# Patient Record
Sex: MALE | Race: WHITE | NOT HISPANIC OR LATINO | Employment: FULL TIME | URBAN - METROPOLITAN AREA
[De-identification: names, ages, dates, MRNs, and addresses within clinical notes are randomized per-mention and may not be internally consistent; named-entity substitution may affect disease eponyms.]

---

## 2017-01-17 NOTE — PROGRESS NOTES
SUBJECTIVE:     CC: Alejandro Johnson is an 46 year old male who presents for preventative health visit.   Cristian Farrell MD  [facility] for HPI/ROS submitted by the patient on 1/24/2017   Annual Exam:  Getting at least 3 servings of Calcium per day:: NO  Bi-annual eye exam:: Yes  Dental care twice a year:: Yes  Sleep apnea or symptoms of sleep apnea:: None  Diet:: Regular (no restrictions)  Frequency of exercise:: None  Taking medications regularly:: Yes  Medication side effects:: Other  Additional concerns today:: No  PHQ-2 Depressed: Several days, Several days  PHQ-2 Score: 2    1. No change at all.    Asthma under good    Today's PHQ-2 Score:   PHQ-2 ( 1999 Pfizer) 2/16/2012   Q1: Little interest or pleasure in doing things 1   Q2: Feeling down, depressed or hopeless 1   PHQ-2 Score 2       Abuse: Current or Past(Physical, Sexual or Emotional)- No  Do you feel safe in your environment - Yes    Social History   Substance Use Topics     Smoking status: Never Smoker      Smokeless tobacco: Never Used     Alcohol Use: No     The patient does not drink >3 drinks per day nor >7 drinks per week.     * Patient needs refills on medications.  Albania Villarreal MA      Last PSA: No results found for: PSA    Recent Labs   Lab Test  12/31/13   1435  02/19/12   1541   CHOL  193  198   HDL  36*  36*   LDL  130*  126   TRIG  136  181*   CHOLHDLRATIO  5.4*  6.0*       Reviewed orders with patient. Reviewed health maintenance and updated orders accordingly - Yes    All Histories reviewed and updated in Epic.      ROS:  C: NEGATIVE for fever, chills, change in weight  I: NEGATIVE for worrisome rashes, moles or lesions  E: NEGATIVE for vision changes or irritation  ENT: NEGATIVE for ear, mouth and throat problems  R: NEGATIVE for significant cough or SOB  CV: NEGATIVE for chest pain, palpitations or peripheral edema  GI: NEGATIVE for nausea, abdominal pain, heartburn, or change in bowel habits    male: negative for dysuria, hematuria, decreased urinary stream, erectile dysfunction, urethral discharge  M: NEGATIVE for significant arthralgias or myalgia  N: NEGATIVE for weakness, dizziness or paresthesias  P: NEGATIVE for changes in mood or affect    Problem list, Medication list, Allergies, and Medical/Social/Surgical histories reviewed in [facility] and updated as appropriate.  Labs reviewed in EPIC  BP Readings from Last 3 Encounters:   01/24/17 112/78   01/20/16 134/80   01/08/15 124/78    Wt Readings from Last 3 Encounters:   01/24/17 212 lb (96.163 kg)   01/20/16 214 lb (97.07 kg)   01/08/15 216 lb (97.977 kg)                  Patient Active Problem List   Diagnosis     CARDIOVASCULAR SCREENING; LDL GOAL LESS THAN 160     Moderate recurrent major depression (H)     ADD (attention deficit disorder)     Family history of diabetes mellitus     Overweight (BMI 25.0-29.9)     Seborrheic dermatitis     Asthma, mild intermittent, well-controlled     Intermittent asthma     Chronic tonsillitis     Advanced directives, counseling/discussion     Eosinophilic esophagitis     Past Surgical History   Procedure Laterality Date     Ent surgery       septoplasty     [place] teeth extraction       Tonsillectomy adult  8/7/2012     Procedure: TONSILLECTOMY ADULT;  Tonsillectomy;  Surgeon: Bipin San MD;  Location:  OR     Endoscopy  6-11-14       Social History   Substance Use Topics     Smoking status: Never Smoker      Smokeless tobacco: Never Used     Alcohol Use: No     Family History   Problem Relation Age of Onset     DIABETES Mother 72     DIABETES Father          Current Outpatient Prescriptions   Medication Sig Dispense Refill     venlafaxine (EFFEXOR-XR) 75 MG 24 hr capsule Take 1 capsule (75 mg) by mouth daily 90 capsule 3     triamcinolone (KENALOG) 0.1 % cream Apply sparingly to affected area three times daily as needed 80 g 11     sildenafil (REVATIO/VIAGRA) 100 MG cap/tab Take 1 tablet (100 mg) by  mouth daily as needed for erectile dysfunction 18 tablet 11     loperamide (IMODIUM A-D) 2 MG tablet Start with 2 tabs (4 mg), then take one tab (2 mg) after each diarrheal stool.  Do not use more than  8 tabs (16 mg) per day. 30 tablet 1     No Known Allergies  OBJECTIVE:     There were no vitals taken for this visit.  EXAM:  GENERAL: healthy, alert and no distress  EYES: Eyes grossly normal to inspection, PERRL and conjunctivae and sclerae normal  HENT: ear canals and TM's normal, nose and mouth without ulcers or lesions  NECK: no adenopathy, no asymmetry, masses, or scars and thyroid normal to palpation  RESP: lungs clear to auscultation - no rales, rhonchi or wheezes  CV: regular rate and rhythm, normal S1 S2, no S3 or S4, no murmur, click or rub, no peripheral edema and peripheral pulses strong  ABDOMEN: soft, nontender, no hepatosplenomegaly, no masses and bowel sounds normal  MS: no gross musculoskeletal defects noted, no edema  SKIN: no suspicious lesions or rashes  NEURO: Normal strength and tone, mentation intact and speech normal  BACK: no CVA tenderness, no paralumbar tenderness  PSYCH: mentation appears normal, affect normal/bright    ASSESSMENT/PLAN:         ICD-10-CM    1. Decreased libido R68.82 Prolactin     Basic metabolic panel     Lipid panel reflex to direct LDL     Testosterone, total     sildenafil (REVATIO/VIAGRA) 100 MG cap/tab   2. Moderate recurrent major depression (H) F33.1 venlafaxine (EFFEXOR-XR) 75 MG 24 hr capsule   3. Lichen planus L43.9 triamcinolone (KENALOG) 0.1 % cream   4. Need for prophylactic vaccination and inoculation against influenza Z23 FLU VAC, SPLIT VIRUS IM > 3 YO (QUADRIVALENT) [23355]     Vaccine Administration, Initial [94415]       COUNSELING:  Reviewed preventive health counseling, as reflected in patient instructions       Consider AAA screening for ages 65-75 and smoking history       Regular exercise       Healthy diet/nutrition       Vision screening        "Hearing screening         reports that he has never smoked. He has never used smokeless tobacco.    Estimated body mass index is 28.52 kg/(m^2) as calculated from the following:    Height as of 1/20/16: 6' 0.64\" (1.845 m).    Weight as of 1/20/16: 214 lb (97.07 kg).       ICD-10-CM    1. Decreased libido R68.82 Prolactin     Basic metabolic panel     Lipid panel reflex to direct LDL     Testosterone, total     sildenafil (REVATIO/VIAGRA) 100 MG cap/tab   2. Moderate recurrent major depression (H) F33.1 venlafaxine (EFFEXOR-XR) 75 MG 24 hr capsule   3. Lichen planus L43.9 triamcinolone (KENALOG) 0.1 % cream   4. Need for prophylactic vaccination and inoculation against influenza Z23 FLU VAC, SPLIT VIRUS IM > 3 YO (QUADRIVALENT) [34291]     Vaccine Administration, Initial [40818]     Counseling Resources:  ATP IV Guidelines  Pooled Cohorts Equation Calculator  FRAX Risk Assessment  ICSI Preventive Guidelines  Dietary Guidelines for Americans, 2010  USDA's MyPlate  ASA Prophylaxis  Lung CA Screening      "

## 2017-01-24 ENCOUNTER — OFFICE VISIT (OUTPATIENT)
Dept: PEDIATRICS | Facility: CLINIC | Age: 47
End: 2017-01-24
Payer: COMMERCIAL

## 2017-01-24 VITALS
OXYGEN SATURATION: 96 % | WEIGHT: 212 LBS | TEMPERATURE: 97.8 F | DIASTOLIC BLOOD PRESSURE: 78 MMHG | BODY MASS INDEX: 28.1 KG/M2 | SYSTOLIC BLOOD PRESSURE: 112 MMHG | HEART RATE: 82 BPM | HEIGHT: 73 IN

## 2017-01-24 DIAGNOSIS — L43.9 LICHEN PLANUS: ICD-10-CM

## 2017-01-24 DIAGNOSIS — Z00.00 ENCOUNTER FOR ROUTINE ADULT HEALTH EXAMINATION WITHOUT ABNORMAL FINDINGS: ICD-10-CM

## 2017-01-24 DIAGNOSIS — R68.82 DECREASED LIBIDO: Primary | ICD-10-CM

## 2017-01-24 DIAGNOSIS — F33.1 MODERATE RECURRENT MAJOR DEPRESSION (H): ICD-10-CM

## 2017-01-24 DIAGNOSIS — Z23 NEED FOR PROPHYLACTIC VACCINATION AND INOCULATION AGAINST INFLUENZA: ICD-10-CM

## 2017-01-24 LAB
ANION GAP SERPL CALCULATED.3IONS-SCNC: 9 MMOL/L (ref 3–14)
BUN SERPL-MCNC: 11 MG/DL (ref 7–30)
CALCIUM SERPL-MCNC: 9.2 MG/DL (ref 8.5–10.1)
CHLORIDE SERPL-SCNC: 104 MMOL/L (ref 94–109)
CHOLEST SERPL-MCNC: 186 MG/DL
CO2 SERPL-SCNC: 25 MMOL/L (ref 20–32)
CREAT SERPL-MCNC: 0.77 MG/DL (ref 0.66–1.25)
GFR SERPL CREATININE-BSD FRML MDRD: ABNORMAL ML/MIN/1.7M2
GLUCOSE SERPL-MCNC: 105 MG/DL (ref 70–99)
HDLC SERPL-MCNC: 37 MG/DL
LDLC SERPL CALC-MCNC: 112 MG/DL
NONHDLC SERPL-MCNC: 149 MG/DL
POTASSIUM SERPL-SCNC: 4 MMOL/L (ref 3.4–5.3)
PROLACTIN SERPL-MCNC: 9 UG/L (ref 2–18)
SODIUM SERPL-SCNC: 138 MMOL/L (ref 133–144)
TRIGL SERPL-MCNC: 183 MG/DL

## 2017-01-24 PROCEDURE — 90686 IIV4 VACC NO PRSV 0.5 ML IM: CPT | Performed by: INTERNAL MEDICINE

## 2017-01-24 PROCEDURE — 99396 PREV VISIT EST AGE 40-64: CPT | Mod: 25 | Performed by: INTERNAL MEDICINE

## 2017-01-24 PROCEDURE — 90471 IMMUNIZATION ADMIN: CPT | Performed by: INTERNAL MEDICINE

## 2017-01-24 PROCEDURE — 80048 BASIC METABOLIC PNL TOTAL CA: CPT | Performed by: INTERNAL MEDICINE

## 2017-01-24 PROCEDURE — 99213 OFFICE O/P EST LOW 20 MIN: CPT | Mod: 25 | Performed by: INTERNAL MEDICINE

## 2017-01-24 PROCEDURE — 80061 LIPID PANEL: CPT | Performed by: INTERNAL MEDICINE

## 2017-01-24 PROCEDURE — 84403 ASSAY OF TOTAL TESTOSTERONE: CPT | Performed by: INTERNAL MEDICINE

## 2017-01-24 PROCEDURE — 84146 ASSAY OF PROLACTIN: CPT | Performed by: INTERNAL MEDICINE

## 2017-01-24 PROCEDURE — 36415 COLL VENOUS BLD VENIPUNCTURE: CPT | Performed by: INTERNAL MEDICINE

## 2017-01-24 RX ORDER — VENLAFAXINE HYDROCHLORIDE 75 MG/1
75 CAPSULE, EXTENDED RELEASE ORAL DAILY
Qty: 90 CAPSULE | Refills: 3 | Status: SHIPPED | OUTPATIENT
Start: 2017-01-24 | End: 2018-01-02

## 2017-01-24 RX ORDER — TRIAMCINOLONE ACETONIDE 1 MG/G
CREAM TOPICAL
Qty: 80 G | Refills: 11 | Status: SHIPPED | OUTPATIENT
Start: 2017-01-24 | End: 2018-02-01

## 2017-01-24 RX ORDER — SILDENAFIL 100 MG/1
100 TABLET, FILM COATED ORAL DAILY PRN
Qty: 18 TABLET | Refills: 11 | Status: SHIPPED
Start: 2017-01-24 | End: 2018-02-01

## 2017-01-24 ASSESSMENT — ASTHMA QUESTIONNAIRES
QUESTION_2 LAST FOUR WEEKS HOW OFTEN HAVE YOU HAD SHORTNESS OF BREATH: NOT AT ALL
QUESTION_5 LAST FOUR WEEKS HOW WOULD YOU RATE YOUR ASTHMA CONTROL: COMPLETELY CONTROLLED
QUESTION_3 LAST FOUR WEEKS HOW OFTEN DID YOUR ASTHMA SYMPTOMS (WHEEZING, COUGHING, SHORTNESS OF BREATH, CHEST TIGHTNESS OR PAIN) WAKE YOU UP AT NIGHT OR EARLIER THAN USUAL IN THE MORNING: NOT AT ALL
QUESTION_4 LAST FOUR WEEKS HOW OFTEN HAVE YOU USED YOUR RESCUE INHALER OR NEBULIZER MEDICATION (SUCH AS ALBUTEROL): NOT AT ALL
ACT_TOTALSCORE: 25
QUESTION_1 LAST FOUR WEEKS HOW MUCH OF THE TIME DID YOUR ASTHMA KEEP YOU FROM GETTING AS MUCH DONE AT WORK, SCHOOL OR AT HOME: NONE OF THE TIME

## 2017-01-24 ASSESSMENT — PAIN SCALES - GENERAL: PAINLEVEL: NO PAIN (0)

## 2017-01-24 NOTE — MR AVS SNAPSHOT
After Visit Summary   1/24/2017    Alejandro Johnson    MRN: [id number]           Patient Information     Date Of Birth          1970        Visit Information        Provider Department      1/24/2017 10:00 AM [facility]        Today's Diagnoses     Decreased libido    -  1     Moderate recurrent major depression (H)         Lichen planus           Care Instructions      Preventive Health Recommendations  Male Ages 40 to 49    Yearly exam:             See your health care provider every year in order to  o   Review health changes.   o   Discuss preventive care.    o   Review your medicines if your doctor has prescribed any.    You should be tested each year for STDs (sexually transmitted diseases) if you re at risk.     Have a cholesterol test every 5 years.     Have a colonoscopy (test for colon cancer) if someone in your family has had colon cancer or polyps before age 50.     After age 45, have a diabetes test (fasting glucose). If you are at risk for diabetes, you should have this test every 3 years.      Talk with your health care provider about whether or not a prostate cancer screening test (PSA) is right for you.    Shots: Get a flu shot each year. Get a tetanus shot every 10 years.     Nutrition:    Eat at least 5 servings of fruits and vegetables daily.     Eat whole-grain bread, whole-wheat pasta and brown rice instead of white grains and rice.     Talk to your provider about Calcium and Vitamin D.     Lifestyle    Exercise for at least 150 minutes a week (30 minutes a day, 5 days a week). This will help you control your weight and prevent disease.     Limit alcohol to one drink per day.     No smoking.     Wear sunscreen to prevent skin cancer.     See your dentist every six months for an exam and cleaning.            Follow-ups after your visit        Who to contact     If you have questions or need follow up information about today's clinic  "visit or your schedule please contact [facility] directly at [phone].  Normal or non-critical lab and imaging results will be communicated to you by MyChart, letter or phone within 4 business days after the clinic has received the results. If you do not hear from us within 7 days, please contact the clinic through Virobay[employer] or phone. If you have a critical or abnormal lab result, we will notify you by phone as soon as possible.  Submit refill requests through [employer] or call your pharmacy and they will forward the refill request to us. Please allow 3 business days for your refill to be completed.          Additional Information About Your Visit        VirobayharVidmind Information     [employer]Social gives you secure access to your electronic health record. If you see a primary care provider, you can also send messages to your care team and make appointments. If you have questions, please call your primary care clinic.  If you do not have a primary care provider, please call [phone] and they will assist you.        Care EveryWhere ID     This is your Care EveryWhere ID. This could be used by other organizations to access your [place] medical records  [id number]        Your Vitals Were     Pulse Temperature Height BMI (Body Mass Index) Pulse Oximetry       82 97.8  F (36.6  C) (Oral) 6' 1\" (1.854 m) 27.98 kg/m2 96%        Blood Pressure from Last 3 Encounters:   01/24/17 112/78   01/20/16 134/80   01/08/15 124/78    Weight from Last 3 Encounters:   01/24/17 212 lb (96.163 kg)   01/20/16 214 lb (97.07 kg)   01/08/15 216 lb (97.977 kg)              We Performed the Following     Basic metabolic panel     Lipid panel reflex to direct LDL     Prolactin     Testosterone, total          Today's Medication Changes          These changes are accurate as of: 1/24/17 10:34 AM.  If you have any questions, ask your nurse or doctor.               Start taking these medicines.        Dose/Directions    sildenafil " 100 MG cap/tab   Commonly known as:  REVATIO/VIAGRA   Used for:  Decreased libido   Started by:  Cristian Farrell MD        Dose:  100 mg   Take 1 tablet (100 mg) by mouth daily as needed for erectile dysfunction   Quantity:  18 tablet   Refills:  11         Stop taking these medicines if you haven't already. Please contact your care team if you have questions.     itraconazole 100 MG capsule   Commonly known as:  SPORANOX   Stopped by:  Cristian Farrell MD                Where to get your medicines      These medications were sent to [facility] - [place] - [address]  [address] [place] [place] [address]     Phone:  [phone]    - triamcinolone 0.1 % cream  - venlafaxine 75 MG 24 hr capsule      Call your pharmacy to confirm that your medication is ready for pickup. It may take up to 24 hours for them to receive the prescription. If the prescription is not ready within 3 business days, please contact your clinic or your provider.     We will let you know when these medications are ready. If you don't hear back within 3 business days, please contact us.    - sildenafil 100 MG cap/tab             Primary Care Provider Office Phone # Fax #    Cristian Farrell [phone]       [address] [address]        Thank you!     Thank you for choosing [facility]  for your care. Our goal is always to provide you with excellent care. Hearing back from our patients is one way we can continue to improve our services. Please take a few minutes to complete the written survey that you may receive in the mail after your visit with us. Thank you!             Your Updated Medication List - Protect others around you: Learn how to safely use, store and throw away your medicines at www.disposemymeds.org.          This list is accurate as of: 1/24/17 10:34 AM.  Always use your most recent med list.                    Brand Name Dispense Instructions for use    loperamide 2 MG tablet    IMODIUM A-D    30 tablet    Start with 2 tabs (4 mg), then take one tab (2 mg) after each diarrheal stool.  Do not use more than  8 tabs (16 mg) per day.       sildenafil 100 MG cap/tab    REVATIO/VIAGRA    18 tablet    Take 1 tablet (100 mg) by mouth daily as needed for erectile dysfunction       triamcinolone 0.1 % cream    KENALOG    80 g    Apply sparingly to affected area three times daily as needed       venlafaxine 75 MG 24 hr capsule    EFFEXOR-XR    90 capsule    Take 1 capsule (75 mg) by mouth daily

## 2017-01-24 NOTE — PROGRESS NOTES
Injectable Influenza Immunization Documentation    1.  Is the person to be vaccinated sick today?  No    2. Does the person to be vaccinated have an allergy to eggs or to a component of the vaccine?  No    3. Has the person to be vaccinated today ever had a serious reaction to influenza vaccine in the past?  No    4. Has the person to be vaccinated ever had Guillain-Worcester syndrome?  No     Form completed by [place]ia Villarreal MA

## 2017-01-25 ASSESSMENT — ASTHMA QUESTIONNAIRES: ACT_TOTALSCORE: 25

## 2017-01-26 LAB — TESTOST SERPL-MCNC: 204 NG/DL (ref 240–950)

## 2017-01-31 ENCOUNTER — TELEPHONE (OUTPATIENT)
Dept: PEDIATRICS | Facility: CLINIC | Age: 47
End: 2017-01-31

## 2017-01-31 NOTE — TELEPHONE ENCOUNTER
Forms received from: [employer]   Phone number listed: [phone]   Fax listed: [phone]  Date received: 1-31-17  Form description: viagra order  Once forms are completed, please return to [employer] via fax.  Is patient requesting to be contacted when forms are completed: n/a  Form placed: provider desk  Monserrat England

## 2017-02-06 ENCOUNTER — TELEPHONE (OUTPATIENT)
Dept: PEDIATRICS | Facility: CLINIC | Age: 47
End: 2017-02-06

## 2017-02-06 NOTE — Clinical Note
My Depression Action Plan  Name: Alejandro Johnson   Date of Birth 1970  Date: 2/6/2017    My doctor: Cristian Farrell   My clinic: [address] [id number] [phone]          GREEN    ZONE   Good Control    What it looks like:     Things are going generally well. You have normal up s and down s. You may even feel depressed from time to time, but bad moods usually last less than a day.   What you need to do:  1. Continue to care for yourself (see self care plan)  2. Check your depression survival kit and update it as needed  3. Follow your physician s recommendations including any medication.  4. Do not stop taking medication unless you consult with your physician first.           YELLOW         ZONE Getting Worse    What it looks like:     Depression is starting to interfere with your life.     It may be hard to get out of bed; you may be starting to isolate yourself from others.    Symptoms of depression are starting to last most all day and this has happened for several days.     You may have suicidal thoughts but they are not constant.   What you need to do:     1. Call your care team, your response to treatment will improve if you keep your care team informed of your progress. Yellow periods are signs an adjustment may need to be made.     2. Continue your self-care, even if you have to fake it!    3. Talk to someone in your support network    4. Open up your depression survival kit           RED    ZONE Medical Alert - Get Help    What it looks like:     Depression is seriously interfering with your life.     You may experience these or other symptoms: You can t get out of bed most days, can t work or engage in other necessary activities, you have trouble taking care of basic hygiene, or basic responsibilities, thoughts of suicide or death that will not go away, self-injurious behavior.     What you need to  do:  1. Call your care team and request a same-day appointment. If they are not available (weekends or after hours) call your local crisis line, emergency room or 911.      Electronically signed by: Cristian Farrell MD    Depression Self Care Plan / Survival Kit    Self-Care for Depression  Here s the deal. Your body and mind are really not as separate as most people think.  What you do and think affects how you feel and how you feel influences what you do and think. This means if you do things that people who feel good do, it will help you feel better.  Sometimes this is all it takes.  There is also a place for medication and therapy depending on how severe your depression is, so be sure to consult with your medical provider and/ or Behavioral Health Consultant if your symptoms are worsening or not improving.     In order to better manage my stress, I will:    Exercise  Get some form of exercise, every day. This will help reduce pain and release endorphins, the  feel good  chemicals in your brain. This is almost as good as taking antidepressants!  This is not the same as joining a gym and then never going! (they count on that by the way ) It can be as simple as just going for a walk or doing some gardening, anything that will get you moving.      Hygiene   Maintain good hygiene (Get out of bed in the morning, Make your bed, Brush your teeth, Take a shower, and Get dressed like you were going to work, even if you are unemployed).  If your clothes don't fit try to get ones that do.    Diet  I will strive to eat foods that are good for me, drink plenty of water, and avoid excessive sugar, caffeine, alcohol, and other mood-altering substances.  Some foods that are helpful in depression are: complex carbohydrates, B vitamins, flaxseed, fish or fish oil, fresh fruits and vegetables.    Psychotherapy  I agree to participate in Individual Therapy (if recommended).    Medication  If prescribed medications, I agree to take  them.  Missing doses can result in serious side effects.  I understand that drinking alcohol, or other illicit drug use, may cause potential side effects.  I will not stop my medication abruptly without first discussing it with my provider.    Staying Connected With Others  I will stay in touch with my friends, family members, and my primary care provider/team.    Use your imagination  Be creative.  We all have a creative side; it doesn t matter if it s oil painting, sand castles, or mud pies! This will also kick up the endorphins.    Witness Beauty  (AKA stop and smell the roses) Take a look outside, even in mid-winter. Notice colors, textures. Watch the squirrels and birds.     Service to others  Be of service to others.  There is always someone else in need.  By helping others we can  get out of ourselves  and remember the really important things.  This also provides opportunities for practicing all the other parts of the program.    Humor  Laugh and be silly!  Adjust your TV habits for less news and crime-drama and more comedy.    Control your stress  Try breathing deep, massage therapy, biofeedback, and meditation. Find time to relax each day.     My support system    Clinic Contact:  Phone number:    Contact 1:  Phone number:    Contact 2:  Phone number:    Presybeterian/:  Phone number:    Therapist:  Phone number:    Local crisis center:    Phone number:    Other community support:  Phone number:

## 2017-02-06 NOTE — TELEPHONE ENCOUNTER
Please call this patient and complete a PHQ9 with them. PHQ9 is currently over due. Also due for DAP.  Earnestine North MA      Please complete Asthma Action Plan letter, missing in chart. Give to [facility] to mail out.   Earnestine North MA

## 2017-02-06 NOTE — Clinical Note
My Asthma Action Plan  Name: Alejandro Johnson   YOB: 1970  Date: 2/7/2017   My doctor: Cristian Farrell   My clinic: [facility]      My Control Medicine: none        Dose:   My Rescue Medicine: Albuterol (Proair/Ventolin/Proventil) HFA        Dose: 2 puffs every 4 hours   My Asthma Severity: intermittent  Avoid your asthma triggers: upper respiratory infections        GREEN ZONE   Good Control    I feel good    No cough or wheeze    Can work, sleep and play without asthma symptoms       Take your asthma control medicine every day.     1. If exercise triggers your asthma, take your rescue medication    15 minutes before exercise or sports, and    During exercise if you have asthma symptoms  2. Spacer to use with inhaler: If you have a spacer, make sure to use it with your inhaler             YELLOW ZONE Getting Worse  I have ANY of these:    I do not feel good    Cough or wheeze    Chest feels tight    Wake up at night   1. Keep taking your Green Zone medications  2. Start taking your rescue medicine:    every 20 minutes for up to 1 hour. Then every 4 hours for 24-48 hours.  3. If you stay in the Yellow Zone for more than 12-24 hours, contact your doctor.  4. If you do not return to the Green Zone in 12-24 hours or you get worse, start taking your oral steroid medicine if prescribed by your provider.           RED ZONE Medical Alert - Get Help  I have ANY of these:    I feel awful    Medicine is not helping    Breathing getting harder    Trouble walking or talking    Nose opens wide to breathe       1. Take your rescue medicine NOW  2. If your provider has prescribed an oral steroid medicine, start taking it NOW  3. Call your doctor NOW  4. If you are still in the Red Zone after 20 minutes and you have not reached your doctor:    Take your rescue medicine again and    Call 911 or go to the emergency room right away    See your regular doctor within 2 weeks of an Emergency Room or  Urgent Care visit for follow-up treatment.        The above medication may be given at school or day care?: Yes  Child can carry and use inhaler(s) at school with approval of school nurse?: Yes    Electronically signed by: Cristian Farrell, February 7, 2017    Annual Reminders:  Meet with Asthma Educator,  Flu Shot in the Fall, consider Pneumonia Vaccination for patients with asthma (aged 19 and older).    Pharmacy:    [facility] - [place], [place] - [address] AT [address] - [place], [place] - [address], [address]  WRITTEN PRESCRIPTION REQUESTED  [facility] - [facility] - [address]                    Asthma Triggers  How To Control Things That Make Your Asthma Worse    Triggers are things that make your asthma worse.  Look at the list below to help you find your triggers and what you can do about them.  You can help prevent asthma flare-ups by staying away from your triggers.      Trigger                                                          What you can do   Cigarette Smoke  Tobacco smoke can make asthma worse. Do not allow smoking in your home, car or around you.  Be sure no one smokes at a child s day care or school.  If you smoke, ask your health care provider for ways to help you quit.  Ask family members to quit too.  Ask your health care provider for a referral to Quit Plan to help you quit smoking, or call 4-488-011-PLAN.     Colds, Flu, Bronchitis  These are common triggers of asthma. Wash your hands often.  Don t touch your eyes, nose or mouth.  Get a flu shot every year.     Dust Mites  These are tiny bugs that live in cloth or carpet. They are too small to see. Wash sheets and blankets in hot water every week.   Encase pillows and mattress in dust mite proof covers.  Avoid having carpet if you can. If you have carpet, vacuum weekly.   Use a dust mask and HEPA vacuum.   Pollen and Outdoor Mold  Some people  are allergic to trees, grass, or weed pollen, or molds. Try to keep your windows closed.  Limit time out doors when pollen count is high.   Ask you health care provider about taking medicine during allergy season.     Animal Dander  Some people are allergic to skin flakes, urine or saliva from pets with fur or feathers. Keep pets with fur or feathers out of your home.    If you can t keep the pet outdoors, then keep the pet out of your bedroom.  Keep the bedroom door closed.  Keep pets off cloth furniture and away from stuffed toys.     Mice, Rats, and Cockroaches  Some people are allergic to the waste from these pests.   Cover food and garbage.  Clean up spills and food crumbs.  Store grease in the refrigerator.   Keep food out of the bedroom.   Indoor Mold  This can be a trigger if your home has high moisture. Fix leaking faucets, pipes, or other sources of water.   Clean moldy surfaces.  Dehumidify basement if it is damp and smelly.   Smoke, Strong Odors, and Sprays  These can reduce air quality. Stay away from strong odors and sprays, such as perfume, powder, hair spray, paints, smoke incense, paint, cleaning products, candles and new carpet.   Exercise or Sports  Some people with asthma have this trigger. Be active!  Ask your doctor about taking medicine before sports or exercise to prevent symptoms.    Warm up for 5-10 minutes before and after sports or exercise.     Other Triggers of Asthma  Cold air:  Cover your nose and mouth with a scarf.  Sometimes laughing or crying can be a trigger.  Some medicines and food can trigger asthma.

## 2017-02-07 NOTE — TELEPHONE ENCOUNTER
Spoke with pt and completed ACT and PHQ9. ACT score of 25, PHQ9 score of 3. Last ACT done a month ago and score is the same. Last PHQ9 done in 1/2016 and score was 4, today its 3. Last visit for Depression 1/20/17. No plan in place of when pt should follow up, routing to PCP to advise.     Dr. Bean  1) complete AAP letter  2) sign order for AAP in   3) send back to [facility] with plan for patient's follow up    Earnestine Jane MA

## 2017-02-08 ASSESSMENT — PATIENT HEALTH QUESTIONNAIRE - PHQ9: SUM OF ALL RESPONSES TO PHQ QUESTIONS 1-9: 3

## 2017-02-08 ASSESSMENT — ASTHMA QUESTIONNAIRES: ACT_TOTALSCORE: 25

## 2017-05-05 ENCOUNTER — TELEPHONE (OUTPATIENT)
Dept: FAMILY MEDICINE | Facility: CLINIC | Age: 47
End: 2017-05-05

## 2017-05-05 NOTE — TELEPHONE ENCOUNTER
----- Message from Julius Albina Lewis sent at 5/4/2017  2:42 PM CDT -----  Regarding: pt billing complaint  DOS 1/24/17 From pt complaint W/Q pt stating that all his labs should be linked to preventative visit vs decreased libido dx. I reviewed documentation and it is unclear to me why the labs were done. The labs done were lipid, metabolic, prolactin and testosterone. Is the order right having all these dx's done for decreased libido dx? Can you please review? If you have any additional questions please let me know. Thank you.

## 2018-01-02 DIAGNOSIS — F33.1 MODERATE RECURRENT MAJOR DEPRESSION (H): ICD-10-CM

## 2018-01-02 NOTE — TELEPHONE ENCOUNTER
Requested Prescriptions   Pending Prescriptions Disp Refills     venlafaxine (EFFEXOR-XR) 75 MG 24 hr capsule [Pharmacy Med Name: Venlafaxine HCl ER 75 MG Capsule Extended Release 24 Hour] 90 capsule 2    Last Written Prescription Date:  1-24-17  Last Fill Quantity: 90,  # refills: 3   Last Office Visit with [facility], P or [facility] prescribing provider:  1-24-17   Future Office Visit:      Sig: TAKE 1 CAPSULE BY MOUTH DAILY    Serotonin-Norepinephrine Reuptake Inhibitors  Failed    1/2/2018  7:55 AM       Failed - PHQ-9 score of less than 5 in past 6 months    The PHQ-9 criteria is meant to fail. It requires a PHQ-9 score review         Failed - Recent (6 mo) or future visit with authorizing provider's specialty    Patient had office visit in the last 6 months or has a visit in the next 30 days with authorizing provider.  See chart review.            Passed - Blood pressure under 140/90    BP Readings from Last 3 Encounters:   01/24/17 112/78   01/20/16 134/80   01/08/15 124/78                Passed - Patient is age 18 or older       Passed - Normal serum creatinine on file in past 12 months    Recent Labs   Lab Test  01/24/17   1038   CR  0.77

## 2018-01-03 RX ORDER — VENLAFAXINE HYDROCHLORIDE 75 MG/1
CAPSULE, EXTENDED RELEASE ORAL
Qty: 90 CAPSULE | Refills: 2 | Status: SHIPPED | OUTPATIENT
Start: 2018-01-03 | End: 2018-02-01

## 2018-01-03 NOTE — TELEPHONE ENCOUNTER
PHQ-9 score:    PHQ-9 SCORE 2/7/2017   Total Score -   Total Score 3       Routing refill request to provider for review/approval because:  Patient needs to be seen. Overdue for office visit.  Overdue for PHQ 9    MRI examination of the abdomen

## 2018-02-01 ENCOUNTER — OFFICE VISIT (OUTPATIENT)
Dept: FAMILY MEDICINE | Facility: CLINIC | Age: 48
End: 2018-02-01
Payer: COMMERCIAL

## 2018-02-01 VITALS
WEIGHT: 224.8 LBS | HEART RATE: 79 BPM | DIASTOLIC BLOOD PRESSURE: 83 MMHG | OXYGEN SATURATION: 96 % | BODY MASS INDEX: 30.45 KG/M2 | TEMPERATURE: 97.7 F | HEIGHT: 72 IN | SYSTOLIC BLOOD PRESSURE: 128 MMHG

## 2018-02-01 DIAGNOSIS — K20.0 EOSINOPHILIC ESOPHAGITIS: Primary | ICD-10-CM

## 2018-02-01 DIAGNOSIS — F33.1 MODERATE RECURRENT MAJOR DEPRESSION (H): ICD-10-CM

## 2018-02-01 PROCEDURE — 99213 OFFICE O/P EST LOW 20 MIN: CPT | Performed by: INTERNAL MEDICINE

## 2018-02-01 RX ORDER — BUDESONIDE 0.5 MG/2ML
0.5 INHALANT ORAL DAILY
Qty: 90 ML | Refills: 3 | Status: SHIPPED | OUTPATIENT
Start: 2018-02-01 | End: 2019-11-15

## 2018-02-01 RX ORDER — VENLAFAXINE HYDROCHLORIDE 75 MG/1
CAPSULE, EXTENDED RELEASE ORAL
Qty: 90 CAPSULE | Refills: 3 | Status: SHIPPED | OUTPATIENT
Start: 2018-02-01 | End: 2019-01-12

## 2018-02-01 NOTE — PROGRESS NOTES
SUBJECTIVE:   Alejandro Johnson is a 47 year old male who presents to clinic today for the following health issues:      Depression Followup    Status since last visit: Stable     See PHQ-9 for current symptoms.  Other associated symptoms: None    Complicating factors:   Significant life event:  Yes-  Becoming unemployed   Current substance abuse:  None  Anxiety or Panic symptoms:  No    esophilic esophagitis  Returned. -- esoph  Steroids -      Type 2 diabetes and chf    PHQ-9 1/20/2016 2/7/2017   Total Score 4 3   Q9: Suicide Ideation Not at all Not at all     none  PHQ-9  English  PHQ-9   Any Language  Suicide Assessment Five-step Evaluation and Treatment (SAFE-T)    Amount of exercise or physical activity: 4-5 days/week for an average of 45-60 minutes    Problems taking medications regularly: No    Medication side effects: none    Diet:             Problem list and histories reviewed & adjusted, as indicated.  Additional history: as documented    Patient Active Problem List   Diagnosis     CARDIOVASCULAR SCREENING; LDL GOAL LESS THAN 160     Moderate recurrent major depression (H)     ADD (attention deficit disorder)     Family history of diabetes mellitus     Overweight (BMI 25.0-29.9)     Seborrheic dermatitis     Asthma, mild intermittent, well-controlled     Intermittent asthma     Chronic tonsillitis     Advanced directives, counseling/discussion     Eosinophilic esophagitis     Past Surgical History:   Procedure Laterality Date     ENDOSCOPY  6-11-14     ENT SURGERY      septoplasty     TONSILLECTOMY ADULT  8/7/2012    Procedure: TONSILLECTOMY ADULT;  Tonsillectomy;  Surgeon: Bipin San MD;  Location: [facility] OR     wisdom teeth extraction         Social History   Substance Use Topics     Smoking status: Never Smoker     Smokeless tobacco: Never Used     Alcohol use No     Family History   Problem Relation Age of Onset     DIABETES Mother 72     DIABETES Father          Current Outpatient  Prescriptions   Medication Sig Dispense Refill     budesonide (PULMICORT) 0.5 MG/2ML neb solution Take 2 mLs (0.5 mg) by nebulization daily 90 mL 3     venlafaxine (EFFEXOR-XR) 75 MG 24 hr capsule TAKE 1 CAPSULE BY MOUTH DAILY 90 capsule 3     loperamide (IMODIUM A-D) 2 MG tablet Start with 2 tabs (4 mg), then take one tab (2 mg) after each diarrheal stool.  Do not use more than  8 tabs (16 mg) per day. 30 tablet 1     No Known Allergies  Recent Labs   Lab Test  01/24/17   1038  12/31/13   1435  02/19/12   1541   A1C   --    --   5.3   LDL  112*  130*  126   HDL  37*  36*  36*   TRIG  183*  136  181*   CR  0.77   --    --    GFRESTIMATED  >90  Non  GFR Calc     --    --    GFRESTBLACK  >90   GFR Calc     --    --    POTASSIUM  4.0   --    --    TSH   --    --   1.74        Reviewed and updated as needed this visit by clinical staff  Tobacco  Allergies  Meds  Med Hx  Surg Hx  Fam Hx  Soc Hx      Reviewed and updated as needed this visit by Provider         ROS:  Constitutional, HEENT, cardiovascular, pulmonary, gi and gu systems are negative, except as otherwise noted.    OBJECTIVE:     /83 (BP Location: Right arm, Patient Position: Sitting, Cuff Size: Adult Regular)  Pulse 79  Temp 97.7  F (36.5  C)  Ht 6' (1.829 m)  Wt 224 lb 12.8 oz (102 kg)  SpO2 96%  BMI 30.49 kg/m2  Body mass index is 30.49 kg/(m^2).  GENERAL: healthy, alert and no distress  EYES: Eyes grossly normal to inspection, PERRL and conjunctivae and sclerae normal  HENT: ear canals and TM's normal, nose and mouth without ulcers or lesions  NECK: no adenopathy, no asymmetry, masses, or scars and thyroid normal to palpation  RESP: lungs clear to auscultation - no rales, rhonchi or wheezes  CV: regular rate and rhythm, normal S1 S2, no S3 or S4, no murmur, click or rub, no peripheral edema and peripheral pulses strong  ABDOMEN: soft, nontender, no hepatosplenomegaly, no masses and bowel sounds normal  MS:  no gross musculoskeletal defects noted, no edema  SKIN: no suspicious lesions or rashes  NEURO: Normal strength and tone, mentation intact and speech normal  BACK: no CVA tenderness, no paralumbar tenderness  LYMPH: no cervical, supraclavicular, axillary, or inguinal adenopathy    Diagnostic Test Results:  Results for orders placed or performed in visit on 01/24/17   Prolactin   Result Value Ref Range    Prolactin 9 2 - 18 ug/L   Basic metabolic panel   Result Value Ref Range    Sodium 138 133 - 144 mmol/L    Potassium 4.0 3.4 - 5.3 mmol/L    Chloride 104 94 - 109 mmol/L    Carbon Dioxide 25 20 - 32 mmol/L    Anion Gap 9 3 - 14 mmol/L    Glucose 105 (H) 70 - 99 mg/dL    Urea Nitrogen 11 7 - 30 mg/dL    Creatinine 0.77 0.66 - 1.25 mg/dL    GFR Estimate >90  Non  GFR Calc   >60 mL/min/1.7m2    GFR Estimate If Black >90   GFR Calc   >60 mL/min/1.7m2    Calcium 9.2 8.5 - 10.1 mg/dL   Lipid panel reflex to direct LDL   Result Value Ref Range    Cholesterol 186 <200 mg/dL    Triglycerides 183 (H) <150 mg/dL    HDL Cholesterol 37 (L) >39 mg/dL    LDL Cholesterol Calculated 112 (H) <100 mg/dL    Non HDL Cholesterol 149 (H) <130 mg/dL   Testosterone, total   Result Value Ref Range    Testosterone Total 204 (L) 240 - 950 ng/dL       ASSESSMENT/PLAN:       ICD-10-CM    1. Eosinophilic esophagitis K20.0 budesonide (PULMICORT) 0.5 MG/2ML neb solution   2. Moderate recurrent major depression (H) F33.1 venlafaxine (EFFEXOR-XR) 75 MG 24 hr capsule         {    Cristian Farrell MD  [facility]

## 2018-02-01 NOTE — PATIENT INSTRUCTIONS
[facility]  [facility]        Current Clients  Tel: [phone]  Fax: [phone]   New Client Phone Lines  [phone]    In-Home Adult Therapy (ARM)  [phone]    In-Home Child Therapy  [phone]     Billing  [phone]   [employer] [address]  [address]  [place] [address]   Office Hours  Mon:7am - 9pm  Tues:7am - 9pm  Wed:7am - 9pm  Thurs:7am - 9pm  Fri:7am - 5pm  Sat:   8am - 5pm   Phone Hours  Mon8am - 8pm  Mhqc5xn - 8pm  Istb0ed - 8pm  Talkq9sh - 8pm  Fri   8am - 4pm  New Client Phone Hours  Mon8am - 7pm  Xprk6ke - 7pm  Kqfc7ir - 7pm  Fwhqa8lp - 7pm  Fri   8am - 4p  [facility]    [address], [address]  [place] [address]  Phones answered   Monday - Thursday 8:00-5:00  Friday 8:30 - 4:00    [phone] Phone  [phone] Fax

## 2018-02-01 NOTE — MR AVS SNAPSHOT
After Visit Summary   2/1/2018    Alejandro Johnson    MRN: [id number]           Patient Information     Date Of Birth          1970        Visit Information        Provider Department      2/1/2018 3:00 PM Cristian Farrell MD [facility]        Today's Diagnoses     Eosinophilic esophagitis    -  1    Moderate recurrent major depression (H)          Care Instructions    [place] [place] [facility]  [facility]        Current Clients  Tel: [phone]  Fax: [phone]   New Client Phone Lines  [phone]    In-Home Adult Therapy (Critical access hospital)  [phone]    In-Home Child Therapy  [phone]     Billing  [phone]   [address]  [address]  [place] [address]   Office Hours  Mon:7am - 9pm  Tues:7am - 9pm  Wed:7am - 9pm  Thurs:7am - 9pm  Fri:7am - 5pm  Sat:   8am - 5pm   Phone Hours  Mon8am - 8pm  Zwuw7jf - 8pm  Yxhh4wc - 8pm  Ddvqg3st - 8pm  Fri   8am - 4pm  New Client Phone Hours  Mon8am - 7pm  Zkab5pm - 7pm  Tkwb8vf - 7pm  Elhvd6md - 7pm  Fri   8am - 4p  [facility]    [address], [address]  [place] [address]  Phones answered   Monday - Thursday 8:00-5:00  Friday 8:30 - 4:00    [phone] Phone  [phone] Fax           Follow-ups after your visit        Who to contact     If you have questions or need follow up information about today's clinic visit or your schedule please contact [facility] directly at [phone].  Normal or non-critical lab and imaging results will be communicated to you by MyChart, letter or phone within 4 business days after the clinic has received the results. If you do not hear from us within 7 days, please contact the clinic through MyChart or phone. If you have a critical or abnormal lab result, we will notify you by phone as soon as possible.  Submit refill requests through [employer] or call your  pharmacy and they will forward the refill request to us. Please allow 3 business days for your refill to be completed.          Additional Information About Your Visit        [employer] Information     [employer] gives you secure access to your electronic health record. If you see a primary care provider, you can also send messages to your care team and make appointments. If you have questions, please call your primary care clinic.  If you do not have a primary care provider, please call [phone] and they will assist you.        Care EveryWhere ID     This is your Care EveryWhere ID. This could be used by other organizations to access your [place] medical records  [id number]        Your Vitals Were     Pulse Temperature Height Pulse Oximetry BMI (Body Mass Index)       79 97.7  F (36.5  C) 6' (1.829 m) 96% 30.49 kg/m2        Blood Pressure from Last 3 Encounters:   02/01/18 128/83   01/24/17 112/78   01/20/16 134/80    Weight from Last 3 Encounters:   02/01/18 224 lb 12.8 oz (102 kg)   01/24/17 212 lb (96.2 kg)   01/20/16 214 lb (97.1 kg)              Today, you had the following     No orders found for display         Today's Medication Changes          These changes are accurate as of 2/1/18  3:46 PM.  If you have any questions, ask your nurse or doctor.               Start taking these medicines.        Dose/Directions    budesonide 0.5 MG/2ML neb solution   Commonly known as:  PULMICORT   Used for:  Eosinophilic esophagitis   Started by:  Cristian Farrell MD        Dose:  0.5 mg   Take 2 mLs (0.5 mg) by nebulization daily   Quantity:  90 mL   Refills:  3         These medicines have changed or have updated prescriptions.        Dose/Directions    venlafaxine 75 MG 24 hr capsule   Commonly known as:  EFFEXOR-XR   This may have changed:  See the new instructions.   Used for:  Moderate recurrent major depression (H)   Changed by:  Cristian Farrell MD        TAKE 1 CAPSULE BY MOUTH DAILY   Quantity:  90 capsule    Refills:  3            Where to get your medicines      These medications were sent to [facility] - [address]  [address], [facility] [address]     Phone:  [phone]     budesonide 0.5 MG/2ML neb solution    venlafaxine 75 MG 24 hr capsule                Primary Care Provider Office Phone # Fax #    Cristian Farrell [phone]       [address] [facility] [address]        Equal Access to Services     LEYDA KYLE : Hadii aad ku hadasho Soomaali, waaxda luqadaha, qaybta kaalmada adeegyada, waxay idiin hayaan adeeg khsalud perla . So [facility] [phone].    ATENCIÓN: Si habla español, tiene a dasilva disposición servicios gratuitos de asistencia lingüística. [facility] [phone].    We comply with applicable federal civil rights laws and [place] laws. We do not discriminate on the basis of race, color, national origin, age, disability, sex, sexual orientation, or gender identity.            Thank you!     Thank you for choosing [facility]  for your care. Our goal is always to provide you with excellent care. Hearing back from our patients is one way we can continue to improve our services. Please take a few minutes to complete the written survey that you may receive in the mail after your visit with us. Thank you!             Your Updated Medication List - Protect others around you: Learn how to safely use, store and throw away your medicines at www.disposemymeds.org.          This list is accurate as of 2/1/18  3:46 PM.  Always use your most recent med list.                   Brand Name Dispense Instructions for use Diagnosis    budesonide 0.5 MG/2ML neb solution    PULMICORT    90 mL    Take 2 mLs (0.5 mg) by nebulization daily    Eosinophilic esophagitis       loperamide 2 MG tablet    IMODIUM A-D    30 tablet    Start with 2 tabs (4 mg), then take one tab (2 mg) after each diarrheal stool.  Do not  use more than  8 tabs (16 mg) per day.    Diarrhea       venlafaxine 75 MG 24 hr capsule    EFFEXOR-XR    90 capsule    TAKE 1 CAPSULE BY MOUTH DAILY    Moderate recurrent major depression (H)

## 2018-02-02 ENCOUNTER — TELEPHONE (OUTPATIENT)
Dept: FAMILY MEDICINE | Facility: CLINIC | Age: 48
End: 2018-02-02

## 2018-02-02 ASSESSMENT — PATIENT HEALTH QUESTIONNAIRE - PHQ9: SUM OF ALL RESPONSES TO PHQ QUESTIONS 1-9: 4

## 2018-02-02 NOTE — TELEPHONE ENCOUNTER
Fax message from pharmacy:    Please clarify the quantity for budesonide (PULMICORT) 0.5 MG/2ML neb solution (Is 90 ml [45 ampules] or 90 vials [3 boxes]? ) If appropriate, a 90 day supply is preferred.

## 2018-02-02 NOTE — TELEPHONE ENCOUNTER
Spoke to pharmacist at [facility] - [address] a 90 day supply using 1 vial a day would be dispense 180 mL for 90 day supply - nurse okayed dispense 180 mL     Routing to provider for review and approval.    Sushma Vallejo RN  [facility]

## 2018-11-05 ENCOUNTER — OFFICE VISIT (OUTPATIENT)
Dept: FAMILY MEDICINE | Facility: CLINIC | Age: 48
End: 2018-11-05
Payer: COMMERCIAL

## 2018-11-05 VITALS
TEMPERATURE: 98.8 F | WEIGHT: 222 LBS | DIASTOLIC BLOOD PRESSURE: 75 MMHG | OXYGEN SATURATION: 96 % | SYSTOLIC BLOOD PRESSURE: 123 MMHG | HEART RATE: 85 BPM | BODY MASS INDEX: 30.11 KG/M2

## 2018-11-05 DIAGNOSIS — Z23 NEED FOR PROPHYLACTIC VACCINATION AND INOCULATION AGAINST INFLUENZA: Primary | ICD-10-CM

## 2018-11-05 PROCEDURE — 99396 PREV VISIT EST AGE 40-64: CPT | Mod: 25 | Performed by: INTERNAL MEDICINE

## 2018-11-05 PROCEDURE — 90686 IIV4 VACC NO PRSV 0.5 ML IM: CPT | Performed by: INTERNAL MEDICINE

## 2018-11-05 PROCEDURE — 90471 IMMUNIZATION ADMIN: CPT | Performed by: INTERNAL MEDICINE

## 2018-11-05 ASSESSMENT — ENCOUNTER SYMPTOMS
FREQUENCY: 0
PALPITATIONS: 0
FEVER: 0
DIZZINESS: 0
DYSURIA: 0
SHORTNESS OF BREATH: 0
HEMATOCHEZIA: 0
COUGH: 0
NERVOUS/ANXIOUS: 0
JOINT SWELLING: 0
WEAKNESS: 0
NAUSEA: 0
EYE PAIN: 0
MYALGIAS: 0
DIARRHEA: 0
PARESTHESIAS: 0
SORE THROAT: 0
HEMATURIA: 0
CONSTIPATION: 0
HEADACHES: 0
CHILLS: 0
ABDOMINAL PAIN: 0
HEARTBURN: 0
ARTHRALGIAS: 0

## 2018-11-05 ASSESSMENT — ANXIETY QUESTIONNAIRES
7. FEELING AFRAID AS IF SOMETHING AWFUL MIGHT HAPPEN: NOT AT ALL
2. NOT BEING ABLE TO STOP OR CONTROL WORRYING: NOT AT ALL
3. WORRYING TOO MUCH ABOUT DIFFERENT THINGS: NOT AT ALL
6. BECOMING EASILY ANNOYED OR IRRITABLE: SEVERAL DAYS
5. BEING SO RESTLESS THAT IT IS HARD TO SIT STILL: NOT AT ALL
GAD7 TOTAL SCORE: 1
1. FEELING NERVOUS, ANXIOUS, OR ON EDGE: NOT AT ALL
IF YOU CHECKED OFF ANY PROBLEMS ON THIS QUESTIONNAIRE, HOW DIFFICULT HAVE THESE PROBLEMS MADE IT FOR YOU TO DO YOUR WORK, TAKE CARE OF THINGS AT HOME, OR GET ALONG WITH OTHER PEOPLE: NOT DIFFICULT AT ALL

## 2018-11-05 ASSESSMENT — PATIENT HEALTH QUESTIONNAIRE - PHQ9
5. POOR APPETITE OR OVEREATING: NOT AT ALL
SUM OF ALL RESPONSES TO PHQ QUESTIONS 1-9: 3

## 2018-11-05 NOTE — MR AVS SNAPSHOT
After Visit Summary   11/5/2018    Alejandro Johnson    MRN: [id number]           Patient Information     Date Of Birth          1970        Visit Information        Provider Department      11/5/2018 3:00 PM [facility]        Today's Diagnoses     Need for prophylactic vaccination and inoculation against influenza    -  1      Care Instructions      Preventive Health Recommendations  Male Ages 40 to 49    Yearly exam:             See your health care provider every year in order to  o   Review health changes.   o   Discuss preventive care.    o   Review your medicines if your doctor has prescribed any.    You should be tested each year for STDs (sexually transmitted diseases) if you re at risk.     Have a cholesterol test every 5 years.     Have a colonoscopy (test for colon cancer) if someone in your family has had colon cancer or polyps before age 50.     After age 45, have a diabetes test (fasting glucose). If you are at risk for diabetes, you should have this test every 3 years.      Talk with your health care provider about whether or not a prostate cancer screening test (PSA) is right for you.    Shots: Get a flu shot each year. Get a tetanus shot every 10 years.     Nutrition:    Eat at least 5 servings of fruits and vegetables daily.     Eat whole-grain bread, whole-wheat pasta and brown rice instead of white grains and rice.     Get adequate Calcium and Vitamin D.     Lifestyle    Exercise for at least 150 minutes a week (30 minutes a day, 5 days a week). This will help you control your weight and prevent disease.     Limit alcohol to one drink per day.     No smoking.     Wear sunscreen to prevent skin cancer.     See your dentist every six months for an exam and cleaning.              Follow-ups after your visit        Who to contact     If you have questions or need follow up information about today's clinic visit or your schedule please  contact [facility] directly at [phone].  Normal or non-critical lab and imaging results will be communicated to you by MyChart, letter or phone within 4 business days after the clinic has received the results. If you do not hear from us within 7 days, please contact the clinic through MyChart or phone. If you have a critical or abnormal lab result, we will notify you by phone as soon as possible.  Submit refill requests through StatusPage or call your pharmacy and they will forward the refill request to us. Please allow 3 business days for your refill to be completed.          Additional Information About Your Visit        [employer] Information     StatusPage gives you secure access to your electronic health record. If you see a primary care provider, you can also send messages to your care team and make appointments. If you have questions, please call your primary care clinic.  If you do not have a primary care provider, please call [phone] and they will assist you.        Care EveryWhere ID     This is your Care EveryWhere ID. This could be used by other organizations to access your [facility] medical records  [id number]        Your Vitals Were     Pulse Temperature Pulse Oximetry BMI (Body Mass Index)          85 98.8  F (37.1  C) (Oral) 96% 30.11 kg/m2         Blood Pressure from Last 3 Encounters:   11/05/18 123/75   02/01/18 128/83   01/24/17 112/78    Weight from Last 3 Encounters:   11/05/18 222 lb (100.7 kg)   02/01/18 224 lb 12.8 oz (102 kg)   01/24/17 212 lb (96.2 kg)              We Performed the Following     FLU VACCINE, SPLIT VIRUS, IM (QUADRIVALENT) [27746]- >3 YRS     Vaccine Administration, Initial [50093]        Primary Care Provider Office Phone # Fax #    Cristian Farrell [phone]       [address]  [facility] [address]        Equal Access to Services     LEYDA KYLE AH: Lauren Ariza, aisha herron, rubens garvey  leoncio darbymaggylong grossmanAideaaapple ah. Vikki [facility] [phone].    ATENCIÓN: Si shaq alvarez, tiene a dasilva disposición servicios gratuitos de asistencia lingüística. Echo al [phone].    We comply with applicable federal civil rights laws and [place] laws. We do not discriminate on the basis of race, color, national origin, age, disability, sex, sexual orientation, or gender identity.            Thank you!     Thank you for choosing [facility]  for your care. Our goal is always to provide you with excellent care. Hearing back from our patients is one way we can continue to improve our services. Please take a few minutes to complete the written survey that you may receive in the mail after your visit with us. Thank you!             Your Updated Medication List - Protect others around you: Learn how to safely use, store and throw away your medicines at www.disposemymeds.org.          This list is accurate as of 11/5/18  4:03 PM.  Always use your most recent med list.                   Brand Name Dispense Instructions for use Diagnosis    budesonide 0.5 MG/2ML neb solution    PULMICORT    90 mL    Take 2 mLs (0.5 mg) by nebulization daily    Eosinophilic esophagitis       loperamide 2 MG tablet    IMODIUM A-D    30 tablet    Start with 2 tabs (4 mg), then take one tab (2 mg) after each diarrheal stool.  Do not use more than  8 tabs (16 mg) per day.    Diarrhea       venlafaxine 75 MG 24 hr capsule    EFFEXOR-XR    90 capsule    TAKE 1 CAPSULE BY MOUTH DAILY    Moderate recurrent major depression (H)

## 2018-11-05 NOTE — PROGRESS NOTES
SUBJECTIVE:   CC: Alejandro Johnson is an 48 year old male who presents for preventative health visit.     Physical   Annual:     Getting at least 3 servings of Calcium per day:  NO    Bi-annual eye exam:  Yes    Dental care twice a year:  Yes    Sleep apnea or symptoms of sleep apnea:  None    Diet:  Regular (no restrictions)    Frequency of exercise:  None    Taking medications regularly:  Yes    Medication side effects:  None    Additional concerns today:  No  eosinophilic esophagitis  $800 -        Today's PHQ-2 Score:   PHQ-2 ( 1999 Pfizer) 11/5/2018   Q1: Little interest or pleasure in doing things 1   Q2: Feeling down, depressed or hopeless 1   PHQ-2 Score 2   Q1: Little interest or pleasure in doing things Several days   Q2: Feeling down, depressed or hopeless Several days   PHQ-2 Score 2       Abuse: Current or Past(Physical, Sexual or Emotional)- No  Do you feel safe in your environment - Yes    Social History   Substance Use Topics     Smoking status: Never Smoker     Smokeless tobacco: Never Used     Alcohol use No     Alcohol Use 11/5/2018   If you drink alcohol do you typically have greater than 3 drinks per day OR greater than 7 drinks per week? Not Applicable   No flowsheet data found.    Last PSA: No results found for: PSA    Reviewed orders with patient. Reviewed health maintenance and updated orders accordingly - Yes  Labs reviewed in EPIC  BP Readings from Last 3 Encounters:   11/05/18 123/75   02/01/18 128/83   01/24/17 112/78    Wt Readings from Last 3 Encounters:   11/05/18 222 lb (100.7 kg)   02/01/18 224 lb 12.8 oz (102 kg)   01/24/17 212 lb (96.2 kg)                  Patient Active Problem List   Diagnosis     CARDIOVASCULAR SCREENING; LDL GOAL LESS THAN 160     Moderate recurrent major depression (H)     ADD (attention deficit disorder)     Family history of diabetes mellitus     Overweight (BMI 25.0-29.9)     Seborrheic dermatitis     Asthma, mild intermittent, well-controlled      Intermittent asthma     Chronic tonsillitis     Advanced directives, counseling/discussion     Eosinophilic esophagitis     Past Surgical History:   Procedure Laterality Date     ENDOSCOPY  6-11-14     ENT SURGERY      septoplasty     TONSILLECTOMY ADULT  8/7/2012    Procedure: TONSILLECTOMY ADULT;  Tonsillectomy;  Surgeon: Bipin San MD;  Location: [facility] OR     wisdom teeth extraction         Social History   Substance Use Topics     Smoking status: Never Smoker     Smokeless tobacco: Never Used     Alcohol use No     Family History   Problem Relation Age of Onset     Diabetes Mother 72     Thyroid Disease Mother      Diabetes Father          Current Outpatient Prescriptions   Medication Sig Dispense Refill     budesonide (PULMICORT) 0.5 MG/2ML neb solution Take 2 mLs (0.5 mg) by nebulization daily 90 mL 3     loperamide (IMODIUM A-D) 2 MG tablet Start with 2 tabs (4 mg), then take one tab (2 mg) after each diarrheal stool.  Do not use more than  8 tabs (16 mg) per day. 30 tablet 1     venlafaxine (EFFEXOR-XR) 75 MG 24 hr capsule TAKE 1 CAPSULE BY MOUTH DAILY 90 capsule 3     No Known Allergies    Reviewed and updated as needed this visit by clinical staff         Reviewed and updated as needed this visit by Provider            Review of Systems  CONSTITUTIONAL: NEGATIVE for fever, chills, change in weight  INTEGUMENTARY/SKIN: NEGATIVE for worrisome rashes, moles or lesions  EYES: NEGATIVE for vision changes or irritation  ENT: NEGATIVE for ear, mouth and throat problems  RESP: NEGATIVE for significant cough or SOB  CV: NEGATIVE for chest pain, palpitations or peripheral edema  GI: NEGATIVE for nausea, abdominal pain, heartburn, or change in bowel habits   male: negative for dysuria, hematuria, decreased urinary stream, erectile dysfunction, urethral discharge  MUSCULOSKELETAL: NEGATIVE for significant arthralgias or myalgia  NEURO: NEGATIVE for weakness, dizziness or paresthesias  PSYCHIATRIC: NEGATIVE  for changes in mood or affect    OBJECTIVE:   There were no vitals taken for this visit.    Physical Exam  GENERAL: healthy, alert and no distress  EYES: Eyes grossly normal to inspection, PERRL and conjunctivae and sclerae normal  HENT: ear canals and TM's normal, nose and mouth without ulcers or lesions  NECK: no adenopathy, no asymmetry, masses, or scars and thyroid normal to palpation  RESP: lungs clear to auscultation - no rales, rhonchi or wheezes  CV: regular rate and rhythm, normal S1 S2, no S3 or S4, no murmur, click or rub, no peripheral edema and peripheral pulses strong  ABDOMEN: soft, nontender, no hepatosplenomegaly, no masses and bowel sounds normal  MS: no gross musculoskeletal defects noted, no edema  SKIN: no suspicious lesions or rashes  NEURO: Normal strength and tone, mentation intact and speech normal  BACK: no CVA tenderness, no paralumbar tenderness  PSYCH: mentation appears normal, affect normal/bright    Diagnostic Test Results:  Results for orders placed or performed in visit on 01/24/17   Prolactin   Result Value Ref Range    Prolactin 9 2 - 18 ug/L   Basic metabolic panel   Result Value Ref Range    Sodium 138 133 - 144 mmol/L    Potassium 4.0 3.4 - 5.3 mmol/L    Chloride 104 94 - 109 mmol/L    Carbon Dioxide 25 20 - 32 mmol/L    Anion Gap 9 3 - 14 mmol/L    Glucose 105 (H) 70 - 99 mg/dL    Urea Nitrogen 11 7 - 30 mg/dL    Creatinine 0.77 0.66 - 1.25 mg/dL    GFR Estimate >90  Non  GFR Calc   >60 mL/min/1.7m2    GFR Estimate If Black >90   GFR Calc   >60 mL/min/1.7m2    Calcium 9.2 8.5 - 10.1 mg/dL   Lipid panel reflex to direct LDL   Result Value Ref Range    Cholesterol 186 <200 mg/dL    Triglycerides 183 (H) <150 mg/dL    HDL Cholesterol 37 (L) >39 mg/dL    LDL Cholesterol Calculated 112 (H) <100 mg/dL    Non HDL Cholesterol 149 (H) <130 mg/dL   Testosterone, total   Result Value Ref Range    Testosterone Total 204 (L) 240 - 950 ng/dL        ASSESSMENT/PLAN:       ICD-10-CM    1. Need for prophylactic vaccination and inoculation against influenza Z23 FLU VACCINE, SPLIT VIRUS, IM (QUADRIVALENT) [77924]- >3 YRS     Vaccine Administration, Initial [64717]       COUNSELING:   Reviewed preventive health counseling, as reflected in patient instructions       Consider AAA screening for ages 65-75 and smoking history       Regular exercise       Healthy diet/nutrition       Vision screening       Hearing screening       Colon cancer screening       Prostate cancer screening    BP Readings from Last 1 Encounters:   02/01/18 128/83     Estimated body mass index is 30.49 kg/(m^2) as calculated from the following:    Height as of 2/1/18: 6' (1.829 m).    Weight as of 2/1/18: 224 lb 12.8 oz (102 kg).        ICD-10-CM    1. Need for prophylactic vaccination and inoculation against influenza Z23 FLU VACCINE, SPLIT VIRUS, IM (QUADRIVALENT) [06726]- >3 YRS     Vaccine Administration, Initial [61138]          reports that he has never smoked. He has never used smokeless tobacco.      Counseling Resources:  ATP IV Guidelines  Pooled Cohorts Equation Calculator  FRAX Risk Assessment  ICSI Preventive Guidelines  Dietary Guidelines for Americans, 2010  [employer] MyPlate  ASA Prophylaxis  Lung CA Screening    Cristian Farrell MD  [facility]    Injectable Influenza Immunization Documentation    1.  Is the person to be vaccinated sick today?   No    2. Does the person to be vaccinated have an allergy to a component   of the vaccine?   No  Egg Allergy Algorithm Link    3. Has the person to be vaccinated ever had a serious reaction   to influenza vaccine in the past?   No    4. Has the person to be vaccinated ever had Guillain-Barré syndrome?   No    Form completed by Earnestine Jane MA

## 2018-11-06 ASSESSMENT — ASTHMA QUESTIONNAIRES: ACT_TOTALSCORE: 25

## 2018-11-06 ASSESSMENT — ANXIETY QUESTIONNAIRES: GAD7 TOTAL SCORE: 1

## 2019-01-12 DIAGNOSIS — F33.1 MODERATE RECURRENT MAJOR DEPRESSION (H): ICD-10-CM

## 2019-01-14 RX ORDER — VENLAFAXINE HYDROCHLORIDE 75 MG/1
CAPSULE, EXTENDED RELEASE ORAL
Qty: 90 CAPSULE | Refills: 3 | Status: SHIPPED | OUTPATIENT
Start: 2019-01-14 | End: 2019-11-15

## 2019-01-14 NOTE — TELEPHONE ENCOUNTER
"Requested Prescriptions   Pending Prescriptions Disp Refills     venlafaxine (EFFEXOR-XR) 75 MG 24 hr capsule [Pharmacy Med Name: VENLAFAXINE  75MG  CAP  XR] 90 capsule 3    Last Written Prescription Date:  2/1/18  Last Fill Quantity: 90,  # refills: 3   Last office visit: 11/5/2018 with prescribing provider:     Future Office Visit:     Sig: TAKE 1 CAPSULE BY MOUTH  DAILY    Serotonin-Norepinephrine Reuptake Inhibitors  Failed - 1/12/2019  8:22 PM       Failed - Normal serum creatinine on file in past 12 months    Recent Labs   Lab Test 01/24/17  1038   CR 0.77            Passed - Blood pressure under 140/90 in past 12 months    BP Readings from Last 3 Encounters:   11/05/18 123/75   02/01/18 128/83   01/24/17 112/78                Passed - PHQ-9 score of less than 5 in past 6 months    Please review last PHQ-9 score.          Passed - Medication is active on med list       Passed - Patient is age 18 or older       Passed - Recent (6 mo) or future (30 days) visit within the authorizing provider's specialty    Patient had office visit in the last 6 months or has a visit in the next 30 days with authorizing provider or within the authorizing provider's specialty.  See \"Patient Info\" tab in inbasket, or \"Choose Columns\" in Meds & Orders section of the refill encounter.              "

## 2019-01-14 NOTE — TELEPHONE ENCOUNTER
Routing refill request to provider for review/approval because:  Labs not current:  Creatinine     Paula Braga RN

## 2019-10-04 ENCOUNTER — HEALTH MAINTENANCE LETTER (OUTPATIENT)
Age: 49
End: 2019-10-04

## 2019-11-13 ASSESSMENT — ENCOUNTER SYMPTOMS
NERVOUS/ANXIOUS: 0
PALPITATIONS: 0
EYE PAIN: 0
CONSTIPATION: 0
JOINT SWELLING: 0
SORE THROAT: 0
HEARTBURN: 0
ARTHRALGIAS: 0
HEMATURIA: 0
DIZZINESS: 0
DYSURIA: 0
ABDOMINAL PAIN: 0
MYALGIAS: 0
WEAKNESS: 0
NAUSEA: 0
DIARRHEA: 0
FEVER: 0
CHILLS: 0
COUGH: 0
PARESTHESIAS: 0
HEMATOCHEZIA: 0
HEADACHES: 0
FREQUENCY: 0
SHORTNESS OF BREATH: 0

## 2019-11-15 ENCOUNTER — TELEPHONE (OUTPATIENT)
Dept: FAMILY MEDICINE | Facility: CLINIC | Age: 49
End: 2019-11-15

## 2019-11-15 ENCOUNTER — OFFICE VISIT (OUTPATIENT)
Dept: FAMILY MEDICINE | Facility: CLINIC | Age: 49
End: 2019-11-15
Payer: COMMERCIAL

## 2019-11-15 VITALS
HEART RATE: 91 BPM | SYSTOLIC BLOOD PRESSURE: 120 MMHG | BODY MASS INDEX: 29.69 KG/M2 | TEMPERATURE: 97.9 F | WEIGHT: 224 LBS | HEIGHT: 73 IN | DIASTOLIC BLOOD PRESSURE: 82 MMHG | OXYGEN SATURATION: 97 %

## 2019-11-15 DIAGNOSIS — F33.1 MODERATE RECURRENT MAJOR DEPRESSION (H): ICD-10-CM

## 2019-11-15 DIAGNOSIS — K20.0 EOSINOPHILIC ESOPHAGITIS: ICD-10-CM

## 2019-11-15 DIAGNOSIS — Z00.00 ROUTINE GENERAL MEDICAL EXAMINATION AT A HEALTH CARE FACILITY: Primary | ICD-10-CM

## 2019-11-15 PROCEDURE — 90472 IMMUNIZATION ADMIN EACH ADD: CPT | Performed by: INTERNAL MEDICINE

## 2019-11-15 PROCEDURE — 99396 PREV VISIT EST AGE 40-64: CPT | Mod: 25 | Performed by: INTERNAL MEDICINE

## 2019-11-15 PROCEDURE — 90732 PPSV23 VACC 2 YRS+ SUBQ/IM: CPT | Performed by: INTERNAL MEDICINE

## 2019-11-15 PROCEDURE — 90632 HEPA VACCINE ADULT IM: CPT | Performed by: INTERNAL MEDICINE

## 2019-11-15 PROCEDURE — 90471 IMMUNIZATION ADMIN: CPT | Performed by: INTERNAL MEDICINE

## 2019-11-15 RX ORDER — VENLAFAXINE HYDROCHLORIDE 75 MG/1
75 CAPSULE, EXTENDED RELEASE ORAL DAILY
Qty: 90 CAPSULE | Refills: 3 | Status: SHIPPED | OUTPATIENT
Start: 2019-11-15 | End: 2019-11-20

## 2019-11-15 RX ORDER — BUDESONIDE 0.5 MG/2ML
0.5 INHALANT ORAL DAILY
Qty: 90 ML | Refills: 3 | Status: SHIPPED | OUTPATIENT
Start: 2019-11-15 | End: 2019-12-02

## 2019-11-15 ASSESSMENT — ENCOUNTER SYMPTOMS
PARESTHESIAS: 0
SHORTNESS OF BREATH: 0
FEVER: 0
FREQUENCY: 0
HEMATURIA: 0
DIARRHEA: 0
DIZZINESS: 0
EYE PAIN: 0
WEAKNESS: 0
NERVOUS/ANXIOUS: 0
HEADACHES: 0
HEARTBURN: 0
HEMATOCHEZIA: 0
ABDOMINAL PAIN: 0
NAUSEA: 0
JOINT SWELLING: 0
COUGH: 0
SORE THROAT: 0
CONSTIPATION: 0
DYSURIA: 0
CHILLS: 0
PALPITATIONS: 0
MYALGIAS: 0
ARTHRALGIAS: 0

## 2019-11-15 ASSESSMENT — PATIENT HEALTH QUESTIONNAIRE - PHQ9: SUM OF ALL RESPONSES TO PHQ QUESTIONS 1-9: 2

## 2019-11-15 ASSESSMENT — MIFFLIN-ST. JEOR: SCORE: 1928.87

## 2019-11-15 NOTE — TELEPHONE ENCOUNTER
Patient was seen today in clinic and came back before he left clinic to say he forgot to talk to Dr. Farrell about prescriptions. He [facility]    Paula See FABIAN Walters

## 2019-11-15 NOTE — NURSING NOTE
Prior to immunization administration, verified patients identity using patient s name and date of birth. Please see Immunization Activity for additional information.     Screening Questionnaire for Adult Immunization    Are you sick today?   Yes   Do you have allergies to medications, food, a vaccine component or latex?   No   Have you ever had a serious reaction after receiving a vaccination?   No   Do you have a long-term health problem with heart disease, lung disease, asthma, kidney disease, metabolic disease (e.g. diabetes), anemia, or other blood disorder?   Yes   Do you have cancer, leukemia, HIV/AIDS, or any other immune system problem?   No   In the past 3 months, have you taken medications that affect  your immune system, such as prednisone, other steroids, or anticancer drugs; drugs for the treatment of rheumatoid arthritis, Crohn s disease, or psoriasis; or have you had radiation treatments?   No   Have you had a seizure, or a brain or other nervous system problem?   No   During the past year, have you received a transfusion of blood or blood     products, or been given immune (gamma) globulin or antiviral drug?   No   For women: Are you pregnant or is there a chance you could become        pregnant during the next month?   No   Have you received any vaccinations in the past 4 weeks?   Yes     Immunization questionnaire was positive for at least one answer.  Notified Dr. Farrell.      Patient instructed to remain in clinic for 15 minutes afterwards, and to report any adverse reaction to me immediately.  Vaccine information supplied.  Verified patient's last name and  prior to injectio.     Screening performed by Paula Walters MA on 11/15/2019 at 12:23 PM.

## 2019-11-15 NOTE — PROGRESS NOTES
SUBJECTIVE:   CC: Alejandro Johnson is an 49 year old male who presents for preventative health visit.     Healthy Habits:     Getting at least 3 servings of Calcium per day:  NO    Bi-annual eye exam:  Yes    Dental care twice a year:  Yes    Sleep apnea or symptoms of sleep apnea:  None    Diet:  Regular (no restrictions)    Frequency of exercise:  None    Taking medications regularly:  Yes    Barriers to taking medications:  None    Medication side effects:  None    PHQ-2 Total Score: 1    Additional concerns today:  No    Discuss budesonide.  hiv screening comlete  Zovirax          Today's PHQ-2 Score:   PHQ-2 ( 1999 Pfizer) 11/13/2019   Q1: Little interest or pleasure in doing things 0   Q2: Feeling down, depressed or hopeless 1   PHQ-2 Score 1   Q1: Little interest or pleasure in doing things Not at all   Q2: Feeling down, depressed or hopeless Several days   PHQ-2 Score 1       Abuse: Current or Past(Physical, Sexual or Emotional)- No  Do you feel safe in your environment? Yes        Social History     Tobacco Use     Smoking status: Never Smoker     Smokeless tobacco: Never Used   Substance Use Topics     Alcohol use: No         Alcohol Use 11/13/2019   Prescreen: >3 drinks/day or >7 drinks/week? Not Applicable   Prescreen: >3 drinks/day or >7 drinks/week? -   No flowsheet data found.    Last PSA: No results found for: PSA    Reviewed orders with patient. Reviewed health maintenance and updated orders accordingly - Yes  Lab work is in process  Labs reviewed in EPIC  BP Readings from Last 3 Encounters:   11/15/19 120/82   11/05/18 123/75   02/01/18 128/83    Wt Readings from Last 3 Encounters:   11/15/19 101.6 kg (224 lb)   11/05/18 100.7 kg (222 lb)   02/01/18 102 kg (224 lb 12.8 oz)                  Patient Active Problem List   Diagnosis     CARDIOVASCULAR SCREENING; LDL GOAL LESS THAN 160     Moderate recurrent major depression (H)     ADD (attention deficit disorder)     Family history of diabetes mellitus      Overweight (BMI 25.0-29.9)     Seborrheic dermatitis     Asthma, mild intermittent, well-controlled     Intermittent asthma     Chronic tonsillitis     Advanced directives, counseling/discussion     Eosinophilic esophagitis     Past Surgical History:   Procedure Laterality Date     ENDOSCOPY  6-11-14     ENT SURGERY      septoplasty     TONSILLECTOMY ADULT  8/7/2012    Procedure: TONSILLECTOMY ADULT;  Tonsillectomy;  Surgeon: Bipin San MD;  Location: [facility] OR     wisdom teeth extraction         Social History     Tobacco Use     Smoking status: Never Smoker     Smokeless tobacco: Never Used   Substance Use Topics     Alcohol use: No     Family History   Problem Relation Age of Onset     Diabetes Mother 72     Thyroid Disease Mother      Diabetes Father          Current Outpatient Medications   Medication Sig Dispense Refill     budesonide (PULMICORT) 0.5 MG/2ML neb solution Take 2 mLs (0.5 mg) by nebulization daily 90 mL 3     venlafaxine (EFFEXOR-XR) 75 MG 24 hr capsule Take 1 capsule (75 mg) by mouth daily 90 capsule 3     No Known Allergies  Recent Labs   Lab Test 01/24/17  1038 12/31/13  1435 02/19/12  1541   A1C  --   --  5.3   * 130* 126   HDL 37* 36* 36*   TRIG 183* 136 181*   CR 0.77  --   --    GFRESTIMATED >90  Non  GFR Calc    --   --    GFRESTBLACK >90   GFR Calc    --   --    POTASSIUM 4.0  --   --    TSH  --   --  1.74        Reviewed and updated as needed this visit by clinical staff  Tobacco  Allergies  Meds  Med Hx  Surg Hx  Fam Hx  Soc Hx        Reviewed and updated as needed this visit by Provider            Review of Systems   Constitutional: Negative for chills and fever.   HENT: Negative for congestion, ear pain, hearing loss and sore throat.    Eyes: Negative for pain and visual disturbance.   Respiratory: Negative for cough and shortness of breath.    Cardiovascular: Negative for chest pain, palpitations and peripheral edema.  "  Gastrointestinal: Negative for abdominal pain, constipation, diarrhea, heartburn, hematochezia and nausea.   Genitourinary: Negative for discharge, dysuria, frequency, genital sores, hematuria, impotence and urgency.   Musculoskeletal: Negative for arthralgias, joint swelling and myalgias.   Skin: Negative for rash.   Neurological: Negative for dizziness, weakness, headaches and paresthesias.   Psychiatric/Behavioral: Negative for mood changes. The patient is not nervous/anxious.      CONSTITUTIONAL: NEGATIVE for fever, chills, change in weight  INTEGUMENTARY/SKIN: NEGATIVE for worrisome rashes, moles or lesions  EYES: NEGATIVE for vision changes or irritation  ENT: NEGATIVE for ear, mouth and throat problems  RESP: NEGATIVE for significant cough or SOB  CV: NEGATIVE for chest pain, palpitations or peripheral edema  GI: NEGATIVE for nausea, abdominal pain, heartburn, or change in bowel habits   male: negative for dysuria, hematuria, decreased urinary stream, erectile dysfunction, urethral discharge  MUSCULOSKELETAL: NEGATIVE for significant arthralgias or myalgia  NEURO: NEGATIVE for weakness, dizziness or paresthesias  PSYCHIATRIC: NEGATIVE for changes in mood or affect    OBJECTIVE:   /82 (BP Location: Left arm, Patient Position: Chair, Cuff Size: Adult Large)   Pulse 91   Temp 97.9  F (36.6  C) (Oral)   Ht 1.852 m (6' 0.93\")   Wt 101.6 kg (224 lb)   SpO2 97%   BMI 29.61 kg/m      Physical Exam  GENERAL: healthy, alert and no distress  EYES: Eyes grossly normal to inspection, PERRL and conjunctivae and sclerae normal  HENT: ear canals and TM's normal, nose and mouth without ulcers or lesions  NECK: no adenopathy, no asymmetry, masses, or scars and thyroid normal to palpation  RESP: lungs clear to auscultation - no rales, rhonchi or wheezes  CV: regular rate and rhythm, normal S1 S2, no S3 or S4, no murmur, click or rub, no peripheral edema and peripheral pulses strong  ABDOMEN: soft, nontender, no " "hepatosplenomegaly, no masses and bowel sounds normal  MS: no gross musculoskeletal defects noted, no edema  SKIN: no suspicious lesions or rashes  NEURO: Normal strength and tone, mentation intact and speech normal  BACK: no CVA tenderness, no paralumbar tenderness  PSYCH: mentation appears normal, affect normal/bright  LYMPH: no cervical, supraclavicular, axillary, or inguinal adenopathy    Diagnostic Test Results:  Labs reviewed in Epic  No results found for any visits on 11/15/19.    ASSESSMENT/PLAN:       ICD-10-CM    1. Routine general medical examination at a health care facility Z00.00 Basic metabolic panel     Lipid panel reflex to direct LDL Fasting   2. Moderate recurrent major depression (H) F33.1 venlafaxine (EFFEXOR-XR) 75 MG 24 hr capsule   3. Eosinophilic esophagitis K20.0 budesonide (PULMICORT) 0.5 MG/2ML neb solution       COUNSELING:   Reviewed preventive health counseling, as reflected in patient instructions       Regular exercise       Healthy diet/nutrition       Vision screening       Hearing screening       Family planning       Safe sex practices/STD prevention       Colon cancer screening       Prostate cancer screening    Estimated body mass index is 29.61 kg/m  as calculated from the following:    Height as of this encounter: 1.852 m (6' 0.93\").    Weight as of this encounter: 101.6 kg (224 lb).     Weight management plan: Discussed healthy diet and exercise guidelines     reports that he has never smoked. He has never used smokeless tobacco.      ICD-10-CM    1. Routine general medical examination at a health care facility Z00.00 Basic metabolic panel     Lipid panel reflex to direct LDL Fasting   2. Moderate recurrent major depression (H) F33.1 venlafaxine (EFFEXOR-XR) 75 MG 24 hr capsule   3. Eosinophilic esophagitis K20.0 budesonide (PULMICORT) 0.5 MG/2ML neb solution       Counseling Resources:  ATP IV Guidelines  Pooled Cohorts Equation Calculator  FRAX Risk Assessment  ICSI " Preventive Guidelines  Dietary Guidelines for Americans, 2010  USDA's MyPlate  ASA Prophylaxis  Lung CA Screening    Cristian Farrell MD  [facility]

## 2019-11-16 ASSESSMENT — ASTHMA QUESTIONNAIRES: ACT_TOTALSCORE: 24

## 2019-11-20 ENCOUNTER — TELEPHONE (OUTPATIENT)
Dept: FAMILY MEDICINE | Facility: CLINIC | Age: 49
End: 2019-11-20

## 2019-11-20 DIAGNOSIS — K20.0 EOSINOPHILIC ESOPHAGITIS: ICD-10-CM

## 2019-11-20 DIAGNOSIS — F33.1 MODERATE RECURRENT MAJOR DEPRESSION (H): ICD-10-CM

## 2019-11-21 NOTE — TELEPHONE ENCOUNTER
"Requested Prescriptions   Pending Prescriptions Disp Refills     venlafaxine (EFFEXOR-XR) 75 MG 24 hr capsule [Pharmacy Med Name: VENLAFAXINE  75MG  CAP  XR] 90 capsule 3     Sig: TAKE 1 CAPSULE BY MOUTH  DAILY   Last Written Prescription Date:  11-15-19  Last Fill Quantity: 90,  # refills: 3   Last office visit: 11/15/2019 with prescribing provider:  11-15-19   Future Office Visit:        Serotonin-Norepinephrine Reuptake Inhibitors  Failed - 11/20/2019  8:46 PM        Failed - Normal serum creatinine on file in past 12 months     Recent Labs   Lab Test 01/24/17  1038   CR 0.77             Passed - Blood pressure under 140/90 in past 12 months     BP Readings from Last 3 Encounters:   11/15/19 120/82   11/05/18 123/75   02/01/18 128/83                 Passed - PHQ-9 score of less than 5 in past 6 months     Please review last PHQ-9 score.           Passed - Medication is active on med list        Passed - Patient is age 18 or older        Passed - Recent (6 mo) or future (30 days) visit within the authorizing provider's specialty     Patient had office visit in the last 6 months or has a visit in the next 30 days with authorizing provider or within the authorizing provider's specialty.  See \"Patient Info\" tab in inbasket, or \"Choose Columns\" in Meds & Orders section of the refill encounter.              "

## 2019-11-21 NOTE — TELEPHONE ENCOUNTER
Called patient to verify pharmacy . A year supply was sent to [facility] home delivery and this request it from [facility].. Patient is in [place] and will be back after Thanksgiving. Will postpone message.    Paula Braga RN

## 2019-11-29 NOTE — TELEPHONE ENCOUNTER
Attempt # 1  Called patient at home number.  Was call answered?  No answer, left message to call nurse line at [phone]    Sushma Vallejo RN  [facility]

## 2019-11-29 NOTE — TELEPHONE ENCOUNTER
Nurse sees active my chart - sent my chart message asking to verify pharmacy?    Sushma Vallejo RN  [facility]

## 2019-11-29 NOTE — TELEPHONE ENCOUNTER
PHQ-9 score:    PHQ-9 SCORE 11/15/2019   PHQ-9 Total Score -   PHQ-9 Total Score 2       See note below, what pharmacy does patient to use?       Attempted to call patient at home number, left message on voicemail; patient was instructed to return call to [facility] RN directly on the RN call back line at [phone]   Asha Graham, FLAKITO  [facility]

## 2019-12-02 RX ORDER — BUDESONIDE 0.5 MG/2ML
0.5 INHALANT ORAL DAILY
Qty: 90 ML | Refills: 3 | Status: SHIPPED | OUTPATIENT
Start: 2019-12-02 | End: 2020-10-27

## 2019-12-02 RX ORDER — VENLAFAXINE HYDROCHLORIDE 75 MG/1
CAPSULE, EXTENDED RELEASE ORAL
Qty: 90 CAPSULE | Refills: 3 | Status: SHIPPED | OUTPATIENT
Start: 2019-12-02 | End: 2020-10-23

## 2019-12-02 NOTE — TELEPHONE ENCOUNTER
"Requested Prescriptions   Pending Prescriptions Disp Refills     venlafaxine (EFFEXOR-XR) 75 MG 24 hr capsule [Pharmacy Med Name: VENLAFAXINE  75MG  CAP  XR] 90 capsule 3     Sig: TAKE 1 CAPSULE BY MOUTH  DAILY       Serotonin-Norepinephrine Reuptake Inhibitors  Failed - 11/29/2019  1:19 PM        Failed - Normal serum creatinine on file in past 12 months     Recent Labs   Lab Test 01/24/17  1038   CR 0.77             Passed - Blood pressure under 140/90 in past 12 months     BP Readings from Last 3 Encounters:   11/15/19 120/82   11/05/18 123/75   02/01/18 128/83                 Passed - PHQ-9 score of less than 5 in past 6 months     Please review last PHQ-9 score.           Passed - Medication is active on med list        Passed - Patient is age 18 or older        Passed - Recent (6 mo) or future (30 days) visit within the authorizing provider's specialty     Patient had office visit in the last 6 months or has a visit in the next 30 days with authorizing provider or within the authorizing provider's specialty.  See \"Patient Info\" tab in inbasket, or \"Choose Columns\" in Meds & Orders section of the refill encounter.            budesonide (PULMICORT) 0.5 MG/2ML neb solution 90 mL 3     Sig: Take 2 mLs (0.5 mg) by nebulization daily       There is no refill protocol information for this order      Resending to correct pharmacy per patient my chart message. Was signed by Dr. Farrell 11/15/2019 to incorrect pharmacy.      Sushma Vallejo RN  [facility]          "

## 2020-10-21 DIAGNOSIS — F33.1 MODERATE RECURRENT MAJOR DEPRESSION (H): ICD-10-CM

## 2020-10-23 RX ORDER — VENLAFAXINE HYDROCHLORIDE 75 MG/1
CAPSULE, EXTENDED RELEASE ORAL
Qty: 90 CAPSULE | Refills: 3 | Status: SHIPPED | OUTPATIENT
Start: 2020-10-23 | End: 2020-10-27

## 2020-10-23 NOTE — TELEPHONE ENCOUNTER
Patient has office visit in 4 days with PCP, request is from mail order, should not run out of med before visit.    Route to Dr. Farrell to address at visit next week.    Asha Graham RN  [facility]

## 2020-10-26 ASSESSMENT — ENCOUNTER SYMPTOMS
ARTHRALGIAS: 0
NAUSEA: 0
FREQUENCY: 0
CONSTIPATION: 0
SHORTNESS OF BREATH: 0
CHILLS: 0
PARESTHESIAS: 0
DIARRHEA: 0
DYSURIA: 0
HEARTBURN: 0
DIZZINESS: 0
FEVER: 0
HEADACHES: 0
HEMATOCHEZIA: 0
HEMATURIA: 0
COUGH: 0
WEAKNESS: 0
SORE THROAT: 0
PALPITATIONS: 0
JOINT SWELLING: 0
EYE PAIN: 0
MYALGIAS: 0
ABDOMINAL PAIN: 0
NERVOUS/ANXIOUS: 0

## 2020-10-27 ENCOUNTER — OFFICE VISIT (OUTPATIENT)
Dept: FAMILY MEDICINE | Facility: CLINIC | Age: 50
End: 2020-10-27
Payer: COMMERCIAL

## 2020-10-27 VITALS
WEIGHT: 217 LBS | OXYGEN SATURATION: 95 % | DIASTOLIC BLOOD PRESSURE: 79 MMHG | HEART RATE: 79 BPM | BODY MASS INDEX: 28.68 KG/M2 | SYSTOLIC BLOOD PRESSURE: 121 MMHG

## 2020-10-27 DIAGNOSIS — Z00.00 ROUTINE GENERAL MEDICAL EXAMINATION AT A HEALTH CARE FACILITY: Primary | ICD-10-CM

## 2020-10-27 DIAGNOSIS — F33.1 MODERATE RECURRENT MAJOR DEPRESSION (H): ICD-10-CM

## 2020-10-27 DIAGNOSIS — K20.0 EOSINOPHILIC ESOPHAGITIS: ICD-10-CM

## 2020-10-27 LAB
ANION GAP SERPL CALCULATED.3IONS-SCNC: 5 MMOL/L (ref 3–14)
BUN SERPL-MCNC: 12 MG/DL (ref 7–30)
CALCIUM SERPL-MCNC: 9 MG/DL (ref 8.5–10.1)
CHLORIDE SERPL-SCNC: 107 MMOL/L (ref 94–109)
CHOLEST SERPL-MCNC: 192 MG/DL
CO2 SERPL-SCNC: 26 MMOL/L (ref 20–32)
CREAT SERPL-MCNC: 0.79 MG/DL (ref 0.66–1.25)
GFR SERPL CREATININE-BSD FRML MDRD: >90 ML/MIN/{1.73_M2}
GLUCOSE SERPL-MCNC: 105 MG/DL (ref 70–99)
HDLC SERPL-MCNC: 39 MG/DL
LDLC SERPL CALC-MCNC: 124 MG/DL
NONHDLC SERPL-MCNC: 153 MG/DL
POTASSIUM SERPL-SCNC: 3.9 MMOL/L (ref 3.4–5.3)
SODIUM SERPL-SCNC: 138 MMOL/L (ref 133–144)
TRIGL SERPL-MCNC: 144 MG/DL

## 2020-10-27 PROCEDURE — 99396 PREV VISIT EST AGE 40-64: CPT | Performed by: INTERNAL MEDICINE

## 2020-10-27 PROCEDURE — 80048 BASIC METABOLIC PNL TOTAL CA: CPT | Performed by: INTERNAL MEDICINE

## 2020-10-27 PROCEDURE — 36415 COLL VENOUS BLD VENIPUNCTURE: CPT | Performed by: INTERNAL MEDICINE

## 2020-10-27 PROCEDURE — 80061 LIPID PANEL: CPT | Performed by: INTERNAL MEDICINE

## 2020-10-27 RX ORDER — BUDESONIDE 0.5 MG/2ML
0.5 INHALANT ORAL DAILY
Qty: 90 ML | Refills: 3 | Status: SHIPPED | OUTPATIENT
Start: 2020-10-27 | End: 2021-12-09

## 2020-10-27 RX ORDER — VENLAFAXINE HYDROCHLORIDE 75 MG/1
75 CAPSULE, EXTENDED RELEASE ORAL DAILY
Qty: 90 CAPSULE | Refills: 3 | Status: SHIPPED | OUTPATIENT
Start: 2020-10-27 | End: 2021-11-11

## 2020-10-27 ASSESSMENT — ENCOUNTER SYMPTOMS
ARTHRALGIAS: 0
DIZZINESS: 0
CONSTIPATION: 0
SORE THROAT: 0
WEAKNESS: 0
EYE PAIN: 0
PARESTHESIAS: 0
COUGH: 0
FREQUENCY: 0
HEADACHES: 0
NERVOUS/ANXIOUS: 0
HEARTBURN: 0
HEMATOCHEZIA: 0
PALPITATIONS: 0
DIARRHEA: 0
SHORTNESS OF BREATH: 0
FEVER: 0
ABDOMINAL PAIN: 0
DYSURIA: 0
MYALGIAS: 0
CHILLS: 0
JOINT SWELLING: 0
HEMATURIA: 0
NAUSEA: 0

## 2020-10-27 ASSESSMENT — ASTHMA QUESTIONNAIRES
QUESTION_1 LAST FOUR WEEKS HOW MUCH OF THE TIME DID YOUR ASTHMA KEEP YOU FROM GETTING AS MUCH DONE AT WORK, SCHOOL OR AT HOME: NONE OF THE TIME
ACT_TOTALSCORE: 23
QUESTION_4 LAST FOUR WEEKS HOW OFTEN HAVE YOU USED YOUR RESCUE INHALER OR NEBULIZER MEDICATION (SUCH AS ALBUTEROL): NOT AT ALL
QUESTION_3 LAST FOUR WEEKS HOW OFTEN DID YOUR ASTHMA SYMPTOMS (WHEEZING, COUGHING, SHORTNESS OF BREATH, CHEST TIGHTNESS OR PAIN) WAKE YOU UP AT NIGHT OR EARLIER THAN USUAL IN THE MORNING: NOT AT ALL
QUESTION_5 LAST FOUR WEEKS HOW WOULD YOU RATE YOUR ASTHMA CONTROL: WELL CONTROLLED
QUESTION_2 LAST FOUR WEEKS HOW OFTEN HAVE YOU HAD SHORTNESS OF BREATH: ONCE OR TWICE A WEEK

## 2020-10-27 ASSESSMENT — ANXIETY QUESTIONNAIRES
IF YOU CHECKED OFF ANY PROBLEMS ON THIS QUESTIONNAIRE, HOW DIFFICULT HAVE THESE PROBLEMS MADE IT FOR YOU TO DO YOUR WORK, TAKE CARE OF THINGS AT HOME, OR GET ALONG WITH OTHER PEOPLE: NOT DIFFICULT AT ALL
7. FEELING AFRAID AS IF SOMETHING AWFUL MIGHT HAPPEN: NOT AT ALL
5. BEING SO RESTLESS THAT IT IS HARD TO SIT STILL: NOT AT ALL
1. FEELING NERVOUS, ANXIOUS, OR ON EDGE: NOT AT ALL
6. BECOMING EASILY ANNOYED OR IRRITABLE: SEVERAL DAYS
3. WORRYING TOO MUCH ABOUT DIFFERENT THINGS: SEVERAL DAYS
2. NOT BEING ABLE TO STOP OR CONTROL WORRYING: NOT AT ALL
GAD7 TOTAL SCORE: 2

## 2020-10-27 ASSESSMENT — PATIENT HEALTH QUESTIONNAIRE - PHQ9
SUM OF ALL RESPONSES TO PHQ QUESTIONS 1-9: 5
5. POOR APPETITE OR OVEREATING: NOT AT ALL

## 2020-10-27 NOTE — PROGRESS NOTES
SUBJECTIVE:   CC: Alejandro Johnson is an 50 year old male who presents for preventative health visit.     Patient has been advised of split billing requirements and indicates understanding: Yes  Healthy Habits:     Getting at least 3 servings of Calcium per day:  NO    Bi-annual eye exam:  Yes    Dental care twice a year:  Yes    Sleep apnea or symptoms of sleep apnea:  None    Diet:  Regular (no restrictions)    Frequency of exercise:  None    Taking medications regularly:  Yes    Medication side effects:  None    PHQ-2 Total Score: 2    Additional concerns today:  No      Asthma been doing well.  No use of the albuterol - now off med rec     Eosinophilic Eosphagitis well controlled with swallowed budesonide           Today's PHQ-2 Score:   PHQ-2 ( 1999 Pfizer) 10/26/2020   Q1: Little interest or pleasure in doing things 1   Q2: Feeling down, depressed or hopeless 1   PHQ-2 Score 2   Q1: Little interest or pleasure in doing things Several days   Q2: Feeling down, depressed or hopeless Several days   PHQ-2 Score 2       Abuse: Current or Past(Physical, Sexual or Emotional)- No  Do you feel safe in your environment? Yes      Social History     Tobacco Use     Smoking status: Never Smoker     Smokeless tobacco: Never Used   Substance Use Topics     Alcohol use: No     If you drink alcohol do you typically have >3 drinks per day or >7 drinks per week? Not applicable    Alcohol Use 10/26/2020   Prescreen: >3 drinks/day or >7 drinks/week? Not Applicable   Prescreen: >3 drinks/day or >7 drinks/week? -   No flowsheet data found.    Last PSA: No results found for: PSA    Reviewed orders with patient. Reviewed health maintenance and updated orders accordingly - Yes  Lab work is in process  Labs reviewed in EPIC  BP Readings from Last 3 Encounters:   10/27/20 121/79   11/15/19 120/82   11/05/18 123/75    Wt Readings from Last 3 Encounters:   10/27/20 98.4 kg (217 lb)   11/15/19 101.6 kg (224 lb)   11/05/18 100.7 kg (222 lb)                   Patient Active Problem List   Diagnosis     CARDIOVASCULAR SCREENING; LDL GOAL LESS THAN 160     Moderate recurrent major depression (H)     ADD (attention deficit disorder)     Family history of diabetes mellitus     Overweight (BMI 25.0-29.9)     Seborrheic dermatitis     Asthma, mild intermittent, well-controlled     Intermittent asthma     Chronic tonsillitis     Advanced directives, counseling/discussion     Eosinophilic esophagitis     Past Surgical History:   Procedure Laterality Date     ENDOSCOPY  6-11-14     ENT SURGERY      septoplasty     TONSILLECTOMY ADULT  8/7/2012    Procedure: TONSILLECTOMY ADULT;  Tonsillectomy;  Surgeon: Bipin San MD;  Location: [facility] OR     wisdom teeth extraction         Social History     Tobacco Use     Smoking status: Never Smoker     Smokeless tobacco: Never Used   Substance Use Topics     Alcohol use: No     Family History   Problem Relation Age of Onset     Diabetes Mother 72     Thyroid Disease Mother      Diabetes Father          Current Outpatient Medications   Medication Sig Dispense Refill     budesonide (PULMICORT) 0.5 MG/2ML neb solution Take 2 mLs (0.5 mg) by nebulization daily 90 mL 3     venlafaxine (EFFEXOR-XR) 75 MG 24 hr capsule Take 1 capsule (75 mg) by mouth daily 90 capsule 3     No Known Allergies    Reviewed and updated as needed this visit by clinical staff                 Reviewed and updated as needed this visit by Provider                    Review of Systems   Constitutional: Negative for chills and fever.   HENT: Negative for congestion, ear pain, hearing loss and sore throat.    Eyes: Negative for pain and visual disturbance.   Respiratory: Negative for cough and shortness of breath.    Cardiovascular: Negative for chest pain, palpitations and peripheral edema.   Gastrointestinal: Negative for abdominal pain, constipation, diarrhea, heartburn, hematochezia and nausea.   Genitourinary: Negative for discharge, dysuria,  frequency, genital sores, hematuria, impotence and urgency.   Musculoskeletal: Negative for arthralgias, joint swelling and myalgias.   Skin: Negative for rash.   Neurological: Negative for dizziness, weakness, headaches and paresthesias.   Psychiatric/Behavioral: Negative for mood changes. The patient is not nervous/anxious.      CONSTITUTIONAL: NEGATIVE for fever, chills, change in weight  INTEGUMENTARY/SKIN: NEGATIVE for worrisome rashes, moles or lesions  EYES: NEGATIVE for vision changes or irritation  ENT: NEGATIVE for ear, mouth and throat problems  RESP: NEGATIVE for significant cough or SOB  CV: NEGATIVE for chest pain, palpitations or peripheral edema  GI: NEGATIVE for nausea, abdominal pain, heartburn, or change in bowel habits   male: negative for dysuria, hematuria, decreased urinary stream, erectile dysfunction, urethral discharge  MUSCULOSKELETAL: NEGATIVE for significant arthralgias or myalgia  NEURO: NEGATIVE for weakness, dizziness or paresthesias  PSYCHIATRIC: NEGATIVE for changes in mood or affect    OBJECTIVE:   There were no vitals taken for this visit.    Physical Exam  GENERAL: healthy, alert and no distress  EYES: Eyes grossly normal to inspection, PERRL and conjunctivae and sclerae normal  HENT: ear canals and TM's normal, nose and mouth without ulcers or lesions  NECK: no adenopathy, no asymmetry, masses, or scars and thyroid normal to palpation  RESP: lungs clear to auscultation - no rales, rhonchi or wheezes  CV: regular rate and rhythm, normal S1 S2, no S3 or S4, no murmur, click or rub, no peripheral edema and peripheral pulses strong  ABDOMEN: soft, nontender, no hepatosplenomegaly, no masses and bowel sounds normal  MS: no gross musculoskeletal defects noted, no edema  SKIN: no suspicious lesions or rashes  NEURO: Normal strength and tone, mentation intact and speech normal  BACK: no CVA tenderness, no paralumbar tenderness  PSYCH: mentation appears normal, affect  "normal/bright  LYMPH: no cervical, supraclavicular, axillary, or inguinal adenopathy  shingrix as well -  Diagnostic Test Results:  Labs reviewed in Epic  No results found for any visits on 10/27/20.    ASSESSMENT/PLAN:   Alejandro was seen today for physical.    Diagnoses and all orders for this visit:    Routine general medical examination at a health care facility  -     Basic metabolic panel  (Ca, Cl, CO2, Creat, Gluc, K, Na, BUN)  -     Lipid panel reflex to direct LDL Fasting    Eosinophilic esophagitis  -     budesonide (PULMICORT) 0.5 MG/2ML neb solution; Take 2 mLs (0.5 mg) by nebulization daily    Moderate recurrent major depression (H)  -     venlafaxine (EFFEXOR-XR) 75 MG 24 hr capsule; Take 1 capsule (75 mg) by mouth daily    Other orders  -     Cancel: C RIV4 (FLUBLOK) VACCINE RECOMBINANT DNA PRSRV ANTIBIO FREE, IM [21056]        Patient has been advised of split billing requirements and indicates understanding: Yes  COUNSELING:   Reviewed preventive health counseling, as reflected in patient instructions       Regular exercise       Healthy diet/nutrition       Vision screening       Hearing screening       Family planning       Safe sex practices/STD prevention       HIV screeninx in teen years, 1x in adult years, and at intervals if high risk       Colon cancer screening    Estimated body mass index is 29.61 kg/m  as calculated from the following:    Height as of 11/15/19: 1.852 m (6' 0.93\").    Weight as of 11/15/19: 101.6 kg (224 lb).     Weight management plan: Discussed healthy diet and exercise guidelines    He reports that he has never smoked. He has never used smokeless tobacco.      ICD-10-CM    1. Routine general medical examination at a health care facility  Z00.00 Basic metabolic panel  (Ca, Cl, CO2, Creat, Gluc, K, Na, BUN)     Lipid panel reflex to direct LDL Fasting   2. Eosinophilic esophagitis  K20.0 budesonide (PULMICORT) 0.5 MG/2ML neb solution   3. Moderate recurrent major " depression (H)  F33.1 venlafaxine (EFFEXOR-XR) 75 MG 24 hr capsule       Counseling Resources:  ATP IV Guidelines  Pooled Cohorts Equation Calculator  FRAX Risk Assessment  ICSI Preventive Guidelines  Dietary Guidelines for Americans, 2010  USDA's MyPlate  ASA Prophylaxis  Lung CA Screening    Cristian Farrell MD  [facility]

## 2020-10-28 ASSESSMENT — ANXIETY QUESTIONNAIRES: GAD7 TOTAL SCORE: 2

## 2020-10-28 ASSESSMENT — ASTHMA QUESTIONNAIRES: ACT_TOTALSCORE: 23

## 2020-11-07 ENCOUNTER — HEALTH MAINTENANCE LETTER (OUTPATIENT)
Age: 50
End: 2020-11-07

## 2021-04-13 ENCOUNTER — IMMUNIZATION (OUTPATIENT)
Dept: FAMILY MEDICINE | Facility: CLINIC | Age: 51
End: 2021-04-13
Payer: COMMERCIAL

## 2021-04-13 PROCEDURE — 91301 PR COVID VAC MODERNA 100 MCG/0.5 ML IM: CPT

## 2021-04-13 PROCEDURE — 0011A PR COVID VAC MODERNA 100 MCG/0.5 ML IM: CPT

## 2021-05-11 ENCOUNTER — IMMUNIZATION (OUTPATIENT)
Dept: FAMILY MEDICINE | Facility: CLINIC | Age: 51
End: 2021-05-11
Attending: FAMILY MEDICINE
Payer: COMMERCIAL

## 2021-05-11 PROCEDURE — 91301 PR COVID VAC MODERNA 100 MCG/0.5 ML IM: CPT

## 2021-05-11 PROCEDURE — 0012A PR COVID VAC MODERNA 100 MCG/0.5 ML IM: CPT

## 2021-09-11 ENCOUNTER — HEALTH MAINTENANCE LETTER (OUTPATIENT)
Age: 51
End: 2021-09-11

## 2021-11-08 DIAGNOSIS — F33.1 MODERATE RECURRENT MAJOR DEPRESSION (H): ICD-10-CM

## 2021-11-11 RX ORDER — VENLAFAXINE HYDROCHLORIDE 75 MG/1
CAPSULE, EXTENDED RELEASE ORAL
Qty: 90 CAPSULE | Refills: 3 | Status: SHIPPED | OUTPATIENT
Start: 2021-11-11 | End: 2021-12-09

## 2021-11-11 NOTE — TELEPHONE ENCOUNTER
Routing refill request to provider for review/approval because:  Labs not current:  creatinine  PHQ-9  BP  Appointment needed    Creatinine   Date Value Ref Range Status   10/27/2020 0.79 0.66 - 1.25 mg/dL Final      PHQ-9 score:    PHQ 10/27/2020   PHQ-9 Total Score 5   Q9: Thoughts of better off dead/self-harm past 2 weeks Several days              BP Readings from Last 3 Encounters:   10/27/20 121/79   11/15/19 120/82   11/05/18 123/75              Pending Prescriptions:                       Disp   Refills    venlafaxine (EFFEXOR-XR) 75 MG 24 hr capsu*90 cap*3        Sig: TAKE 1 CAPSULE BY MOUTH  DAILY        Reinaldo Ny RN

## 2021-12-09 ENCOUNTER — OFFICE VISIT (OUTPATIENT)
Dept: FAMILY MEDICINE | Facility: CLINIC | Age: 51
End: 2021-12-09
Payer: COMMERCIAL

## 2021-12-09 VITALS
HEIGHT: 74 IN | BODY MASS INDEX: 28.49 KG/M2 | HEART RATE: 107 BPM | OXYGEN SATURATION: 96 % | SYSTOLIC BLOOD PRESSURE: 122 MMHG | DIASTOLIC BLOOD PRESSURE: 76 MMHG | WEIGHT: 222 LBS

## 2021-12-09 DIAGNOSIS — Z11.59 NEED FOR HEPATITIS C SCREENING TEST: ICD-10-CM

## 2021-12-09 DIAGNOSIS — Z00.00 ROUTINE GENERAL MEDICAL EXAMINATION AT A HEALTH CARE FACILITY: Primary | ICD-10-CM

## 2021-12-09 DIAGNOSIS — Z12.11 SCREEN FOR COLON CANCER: ICD-10-CM

## 2021-12-09 DIAGNOSIS — Z11.4 SCREENING FOR HIV (HUMAN IMMUNODEFICIENCY VIRUS): ICD-10-CM

## 2021-12-09 DIAGNOSIS — K20.0 EOSINOPHILIC ESOPHAGITIS: ICD-10-CM

## 2021-12-09 DIAGNOSIS — F33.1 MODERATE RECURRENT MAJOR DEPRESSION (H): ICD-10-CM

## 2021-12-09 DIAGNOSIS — E78.5 HYPERLIPIDEMIA LDL GOAL <100: ICD-10-CM

## 2021-12-09 PROCEDURE — 99396 PREV VISIT EST AGE 40-64: CPT | Performed by: INTERNAL MEDICINE

## 2021-12-09 RX ORDER — BUDESONIDE 0.5 MG/2ML
0.5 INHALANT ORAL DAILY
Qty: 90 ML | Refills: 11 | Status: SHIPPED | OUTPATIENT
Start: 2021-12-09 | End: 2022-11-29

## 2021-12-09 RX ORDER — VENLAFAXINE HYDROCHLORIDE 75 MG/1
75 CAPSULE, EXTENDED RELEASE ORAL DAILY
Qty: 90 CAPSULE | Refills: 3 | Status: SHIPPED | OUTPATIENT
Start: 2021-12-09 | End: 2022-11-29

## 2021-12-09 ASSESSMENT — ENCOUNTER SYMPTOMS
HEMATOCHEZIA: 0
CONSTIPATION: 0
ABDOMINAL PAIN: 0
PARESTHESIAS: 0
FEVER: 0
DIARRHEA: 0
EYE PAIN: 0
HEADACHES: 0
HEMATURIA: 0
FREQUENCY: 0
CHILLS: 0
NAUSEA: 0
SHORTNESS OF BREATH: 0
NERVOUS/ANXIOUS: 0
SORE THROAT: 0
WEAKNESS: 0
PALPITATIONS: 0
MYALGIAS: 0
COUGH: 0
HEARTBURN: 0
DIZZINESS: 0
ARTHRALGIAS: 0
JOINT SWELLING: 0
DYSURIA: 0

## 2021-12-09 ASSESSMENT — MIFFLIN-ST. JEOR: SCORE: 1918.8

## 2021-12-09 NOTE — PROGRESS NOTES
SUBJECTIVE:   CC: Alejandro Johnson is an 51 year old male who presents for preventative health visit.     Patient has been advised of split billing requirements and indicates understanding: Yes     Healthy Habits:     Getting at least 3 servings of Calcium per day:  NO    Bi-annual eye exam:  Yes    Dental care twice a year:  Yes    Sleep apnea or symptoms of sleep apnea:  Excessive snoring    Diet:  Regular (no restrictions)    Frequency of exercise:  None    Taking medications regularly:  Yes    Barriers to taking medications:  None    Medication side effects:  None    PHQ-2 Total Score: 1    Additional concerns today:  No    Colon cancer screen.  Eosinophilic esophagitis  Budesonide   Drinks a lot of water while   Gets bolus out stricture in he area    budesonide .        Today's PHQ-2 Score:   PHQ-2 ( 1999 Pf[employer]) 12/9/2021   Q1: Little interest or pleasure in doing things 1   Q2: Feeling down, depressed or hopeless 0   PHQ-2 Score 1   PHQ-2 Total Score (12-17 Years)- Positive if 3 or more points; Administer PHQ-A if positive -   Q1: Little interest or pleasure in doing things Several days   Q2: Feeling down, depressed or hopeless Not at all   PHQ-2 Score 1       Abuse: Current or Past(Physical, Sexual or Emotional)- No  Do you feel safe in your environment? Yes    Have you ever done Advance Care Planning? (For example, a Health Directive, POLST, or a discussion with a medical provider or your loved ones about your wishes): No, advance care planning information given to patient to review.  Patient plans to discuss their wishes with loved ones or provider.      Social History     Tobacco Use     Smoking status: Never Smoker     Smokeless tobacco: Never Used   Substance Use Topics     Alcohol use: No     If you drink alcohol do you typically have >3 drinks per day or >7 drinks per week? Not applicable    Alcohol Use 12/9/2021   Prescreen: >3 drinks/day or >7 drinks/week? Not Applicable   Prescreen: >3 drinks/day or  >7 drinks/week? -   No flowsheet data found.    Last PSA: No results found for: PSA    Reviewed orders with patient. Reviewed health maintenance and updated orders accordingly - Yes  Lab work is in process  Labs reviewed in EPIC  BP Readings from Last 3 Encounters:   12/09/21 122/76   10/27/20 121/79   11/15/19 120/82    Wt Readings from Last 3 Encounters:   12/09/21 100.7 kg (222 lb)   10/27/20 98.4 kg (217 lb)   11/15/19 101.6 kg (224 lb)                  Patient Active Problem List   Diagnosis     CARDIOVASCULAR SCREENING; LDL GOAL LESS THAN 160     Moderate recurrent major depression (H)     ADD (attention deficit disorder)     Family history of diabetes mellitus     Overweight (BMI 25.0-29.9)     Seborrheic dermatitis     Asthma, mild intermittent, well-controlled     Intermittent asthma     Chronic tonsillitis     Advanced directives, counseling/discussion     Eosinophilic esophagitis     Past Surgical History:   Procedure Laterality Date     ENDOSCOPY  6-11-14     ENT SURGERY      septoplasty     TONSILLECTOMY ADULT  8/7/2012    Procedure: TONSILLECTOMY ADULT;  Tonsillectomy;  Surgeon: Bipin San MD;  Location: [facility] OR     wisdom teeth extraction         Social History     Tobacco Use     Smoking status: Never Smoker     Smokeless tobacco: Never Used   Substance Use Topics     Alcohol use: No     Family History   Problem Relation Age of Onset     Diabetes Mother 72     Thyroid Disease Mother      Diabetes Father          Current Outpatient Medications   Medication Sig Dispense Refill     budesonide (PULMICORT) 0.5 MG/2ML neb solution Take 2 mLs (0.5 mg) by nebulization daily 90 mL 11     venlafaxine (EFFEXOR-XR) 75 MG 24 hr capsule Take 1 capsule (75 mg) by mouth daily 90 capsule 3     No Known Allergies    Reviewed and updated as needed this visit by clinical staff  Tobacco  Allergies  Meds   Med Hx  Surg Hx  Fam Hx  Soc Hx       Reviewed and updated as needed this visit by Provider           "         Review of Systems   Constitutional: Negative for chills and fever.   HENT: Negative for congestion, ear pain, hearing loss and sore throat.    Eyes: Negative for pain and visual disturbance.   Respiratory: Negative for cough and shortness of breath.    Cardiovascular: Negative for chest pain, palpitations and peripheral edema.   Gastrointestinal: Negative for abdominal pain, constipation, diarrhea, heartburn, hematochezia and nausea.   Genitourinary: Negative for dysuria, frequency, genital sores, hematuria, impotence, penile discharge and urgency.   Musculoskeletal: Negative for arthralgias, joint swelling and myalgias.   Skin: Negative for rash.   Neurological: Negative for dizziness, weakness, headaches and paresthesias.   Psychiatric/Behavioral: Negative for mood changes. The patient is not nervous/anxious.      CONSTITUTIONAL: NEGATIVE for fever, chills, change in weight  INTEGUMENTARY/SKIN: NEGATIVE for worrisome rashes, moles or lesions  EYES: NEGATIVE for vision changes or irritation  ENT: NEGATIVE for ear, mouth and throat problems  RESP: NEGATIVE for significant cough or SOB  CV: NEGATIVE for chest pain, palpitations or peripheral edema  GI: NEGATIVE for nausea, abdominal pain, heartburn, or change in bowel habits   male: negative for dysuria, hematuria, decreased urinary stream, erectile dysfunction, urethral discharge  MUSCULOSKELETAL: NEGATIVE for significant arthralgias or myalgia  NEURO: NEGATIVE for weakness, dizziness or paresthesias  PSYCHIATRIC: NEGATIVE for changes in mood or affect    OBJECTIVE:   /76   Pulse 107   Ht 1.867 m (6' 1.5\")   Wt 100.7 kg (222 lb)   SpO2 96%   BMI 28.89 kg/m      Physical Exam  GENERAL: healthy, alert and no distress  EYES: Eyes grossly normal to inspection, PERRL and conjunctivae and sclerae normal  HENT: ear canals and TM's normal, nose and mouth without ulcers or lesions  NECK: no adenopathy, no asymmetry, masses, or scars and thyroid normal " to palpation  RESP: lungs clear to auscultation - no rales, rhonchi or wheezes  CV: regular rate and rhythm, normal S1 S2, no S3 or S4, no murmur, click or rub, no peripheral edema and peripheral pulses strong  ABDOMEN: soft, nontender, no hepatosplenomegaly, no masses and bowel sounds normal  MS: no gross musculoskeletal defects noted, no edema  SKIN: no suspicious lesions or rashes  NEURO: Normal strength and tone, mentation intact and speech normal  BACK: no CVA tenderness, no paralumbar tenderness  PSYCH: mentation appears normal, affect normal/bright  LYMPH: no cervical, supraclavicular, axillary, or inguinal adenopathy    Diagnostic Test Results:  Labs reviewed in Epic  No results found for any visits on 12/09/21.    ASSESSMENT/PLAN:   Alejandro was seen today for physical.    Diagnoses and all orders for this visit:    Routine general medical examination at a health care facility    Screen for colon cancer  -     REVIEW OF HEALTH MAINTENANCE PROTOCOL ORDERS    Screening for HIV (human immunodeficiency virus)  -     HIV Antigen Antibody Combo; Future    Need for hepatitis C screening test  -     Hepatitis C Screen Reflex to HCV RNA Quant and Genotype; Future    Eosinophilic esophagitis  -     budesonide (PULMICORT) 0.5 MG/2ML neb solution; Take 2 mLs (0.5 mg) by nebulization daily  -     **Basic metabolic panel FUTURE 2mo; Future    Hyperlipidemia LDL goal <100  -     Lipid panel reflex to direct LDL Fasting; Future    Moderate recurrent major depression (H)  -     venlafaxine (EFFEXOR-XR) 75 MG 24 hr capsule; Take 1 capsule (75 mg) by mouth daily    consider EGD and colon within the next year based on covid incidence  Considering increase in the effexor to 150 mg po daily      Patient has been advised of split billing requirements and indicates understanding: Yes  COUNSELING:   Reviewed preventive health counseling, as reflected in patient instructions       Regular exercise       Healthy diet/nutrition        "Vision screening       Hearing screening       Colon cancer screening    Estimated body mass index is 28.89 kg/m  as calculated from the following:    Height as of this encounter: 1.867 m (6' 1.5\").    Weight as of this encounter: 100.7 kg (222 lb).     Weight management plan: Discussed healthy diet and exercise guidelines    He reports that he has never smoked. He has never used smokeless tobacco.      Counseling Resources:  ATP IV Guidelines  Pooled Cohorts Equation Calculator  FRAX Risk Assessment  ICSI Preventive Guidelines  Dietary Guidelines for Americans, 2010  USDA's MyPlate  ASA Prophylaxis  Lung CA Screening    Cristian Farrell MD  [facility]  "

## 2022-10-29 ENCOUNTER — HEALTH MAINTENANCE LETTER (OUTPATIENT)
Age: 52
End: 2022-10-29

## 2022-11-16 ENCOUNTER — TELEPHONE (OUTPATIENT)
Dept: FAMILY MEDICINE | Facility: CLINIC | Age: 52
End: 2022-11-16

## 2022-11-16 NOTE — TELEPHONE ENCOUNTER
I am unable to get him in before that time     Ok to schedule soonest available and we can write a note to the insurance about current scheduling issues which are similar [place]

## 2022-11-26 ASSESSMENT — ENCOUNTER SYMPTOMS
DIZZINESS: 0
DIARRHEA: 0
SHORTNESS OF BREATH: 0
PALPITATIONS: 0
MYALGIAS: 0
WEAKNESS: 0
FEVER: 0
JOINT SWELLING: 0
PARESTHESIAS: 0
ARTHRALGIAS: 0
DYSURIA: 0
HEADACHES: 0
COUGH: 0
CHILLS: 0
CONSTIPATION: 0
HEARTBURN: 0
NERVOUS/ANXIOUS: 0
EYE PAIN: 0
SORE THROAT: 0
FREQUENCY: 0
NAUSEA: 0
HEMATOCHEZIA: 0
ABDOMINAL PAIN: 0
HEMATURIA: 0

## 2022-11-26 ASSESSMENT — ASTHMA QUESTIONNAIRES
QUESTION_1 LAST FOUR WEEKS HOW MUCH OF THE TIME DID YOUR ASTHMA KEEP YOU FROM GETTING AS MUCH DONE AT WORK, SCHOOL OR AT HOME: NONE OF THE TIME
QUESTION_3 LAST FOUR WEEKS HOW OFTEN DID YOUR ASTHMA SYMPTOMS (WHEEZING, COUGHING, SHORTNESS OF BREATH, CHEST TIGHTNESS OR PAIN) WAKE YOU UP AT NIGHT OR EARLIER THAN USUAL IN THE MORNING: NOT AT ALL
ACT_TOTALSCORE: 24
QUESTION_5 LAST FOUR WEEKS HOW WOULD YOU RATE YOUR ASTHMA CONTROL: COMPLETELY CONTROLLED
QUESTION_2 LAST FOUR WEEKS HOW OFTEN HAVE YOU HAD SHORTNESS OF BREATH: ONCE OR TWICE A WEEK
QUESTION_4 LAST FOUR WEEKS HOW OFTEN HAVE YOU USED YOUR RESCUE INHALER OR NEBULIZER MEDICATION (SUCH AS ALBUTEROL): NOT AT ALL
ACT_TOTALSCORE: 24

## 2022-11-29 ENCOUNTER — OFFICE VISIT (OUTPATIENT)
Dept: FAMILY MEDICINE | Facility: CLINIC | Age: 52
End: 2022-11-29
Payer: COMMERCIAL

## 2022-11-29 VITALS
BODY MASS INDEX: 28.89 KG/M2 | DIASTOLIC BLOOD PRESSURE: 80 MMHG | HEART RATE: 80 BPM | OXYGEN SATURATION: 97 % | SYSTOLIC BLOOD PRESSURE: 138 MMHG | WEIGHT: 222 LBS | TEMPERATURE: 97.6 F

## 2022-11-29 DIAGNOSIS — Z12.11 SCREEN FOR COLON CANCER: ICD-10-CM

## 2022-11-29 DIAGNOSIS — K20.0 EOSINOPHILIC ESOPHAGITIS: ICD-10-CM

## 2022-11-29 DIAGNOSIS — Z11.59 NEED FOR HEPATITIS C SCREENING TEST: ICD-10-CM

## 2022-11-29 DIAGNOSIS — F33.1 MODERATE RECURRENT MAJOR DEPRESSION (H): ICD-10-CM

## 2022-11-29 DIAGNOSIS — E78.5 HYPERLIPIDEMIA LDL GOAL <100: Primary | ICD-10-CM

## 2022-11-29 DIAGNOSIS — Z11.4 SCREENING FOR HIV (HUMAN IMMUNODEFICIENCY VIRUS): ICD-10-CM

## 2022-11-29 LAB
ANION GAP SERPL CALCULATED.3IONS-SCNC: 5 MMOL/L (ref 3–14)
BUN SERPL-MCNC: 9 MG/DL (ref 7–30)
CALCIUM SERPL-MCNC: 9.2 MG/DL (ref 8.5–10.1)
CHLORIDE BLD-SCNC: 108 MMOL/L (ref 94–109)
CHOLEST SERPL-MCNC: 187 MG/DL
CO2 SERPL-SCNC: 27 MMOL/L (ref 20–32)
CREAT SERPL-MCNC: 0.89 MG/DL (ref 0.66–1.25)
FASTING STATUS PATIENT QL REPORTED: YES
GFR SERPL CREATININE-BSD FRML MDRD: >90 ML/MIN/1.73M2
GLUCOSE BLD-MCNC: 104 MG/DL (ref 70–99)
HCV AB SERPL QL IA: NONREACTIVE
HDLC SERPL-MCNC: 43 MG/DL
HIV 1+2 AB+HIV1 P24 AG SERPL QL IA: NONREACTIVE
LDLC SERPL CALC-MCNC: 119 MG/DL
NONHDLC SERPL-MCNC: 144 MG/DL
POTASSIUM BLD-SCNC: 4 MMOL/L (ref 3.4–5.3)
SODIUM SERPL-SCNC: 140 MMOL/L (ref 133–144)
TRIGL SERPL-MCNC: 126 MG/DL

## 2022-11-29 PROCEDURE — 90715 TDAP VACCINE 7 YRS/> IM: CPT | Performed by: INTERNAL MEDICINE

## 2022-11-29 PROCEDURE — 87389 HIV-1 AG W/HIV-1&-2 AB AG IA: CPT | Performed by: INTERNAL MEDICINE

## 2022-11-29 PROCEDURE — 99396 PREV VISIT EST AGE 40-64: CPT | Mod: 25 | Performed by: INTERNAL MEDICINE

## 2022-11-29 PROCEDURE — 86803 HEPATITIS C AB TEST: CPT | Performed by: INTERNAL MEDICINE

## 2022-11-29 PROCEDURE — 36415 COLL VENOUS BLD VENIPUNCTURE: CPT | Performed by: INTERNAL MEDICINE

## 2022-11-29 PROCEDURE — 90471 IMMUNIZATION ADMIN: CPT | Performed by: INTERNAL MEDICINE

## 2022-11-29 PROCEDURE — 80048 BASIC METABOLIC PNL TOTAL CA: CPT | Performed by: INTERNAL MEDICINE

## 2022-11-29 PROCEDURE — 80061 LIPID PANEL: CPT | Performed by: INTERNAL MEDICINE

## 2022-11-29 RX ORDER — VENLAFAXINE HYDROCHLORIDE 75 MG/1
75 CAPSULE, EXTENDED RELEASE ORAL DAILY
Qty: 90 CAPSULE | Refills: 3 | Status: SHIPPED | OUTPATIENT
Start: 2022-11-29 | End: 2023-11-09

## 2022-11-29 RX ORDER — BUDESONIDE 0.5 MG/2ML
0.5 INHALANT ORAL DAILY
Qty: 90 ML | Refills: 11 | Status: SHIPPED | OUTPATIENT
Start: 2022-11-29 | End: 2023-11-09

## 2022-11-29 ASSESSMENT — ENCOUNTER SYMPTOMS
SORE THROAT: 0
WEAKNESS: 0
PARESTHESIAS: 0
ARTHRALGIAS: 0
HEADACHES: 0
HEMATOCHEZIA: 0
JOINT SWELLING: 0
CONSTIPATION: 0
NERVOUS/ANXIOUS: 0
DIARRHEA: 0
COUGH: 0
NAUSEA: 0
MYALGIAS: 0
FEVER: 0
HEMATURIA: 0
PALPITATIONS: 0
CHILLS: 0
SHORTNESS OF BREATH: 0
EYE PAIN: 0
ABDOMINAL PAIN: 0
HEARTBURN: 0
FREQUENCY: 0
DIZZINESS: 0
DYSURIA: 0

## 2022-11-29 NOTE — PROGRESS NOTES
SUBJECTIVE:   CC: Alejandro is an 52 year old who presents for preventative health visit.     Patient has been advised of split billing requirements and indicates understanding: Yes  Healthy Habits:     Getting at least 3 servings of Calcium per day:  NO    Bi-annual eye exam:  Yes    Dental care twice a year:  Yes    Sleep apnea or symptoms of sleep apnea:  None    Diet:  Regular (no restrictions)    Frequency of exercise:  None    Taking medications regularly:  Yes    PHQ-2 Total Score: 2    Additional concerns today:  No  eosinophilic esophagitis  Some changes once every few weeks   Some stricture        Today's PHQ-2 Score:   PHQ-2 ( 1999 Pfizer) 11/26/2022   Q1: Little interest or pleasure in doing things 1   Q2: Feeling down, depressed or hopeless 1   PHQ-2 Score 2   PHQ-2 Total Score (12-17 Years)- Positive if 3 or more points; Administer PHQ-A if positive -   Q1: Little interest or pleasure in doing things Several days   Q2: Feeling down, depressed or hopeless Several days   PHQ-2 Score 2           Social History     Tobacco Use     Smoking status: Never     Smokeless tobacco: Never   Substance Use Topics     Alcohol use: No     If you drink alcohol do you typically have >3 drinks per day or >7 drinks per week? No    Alcohol Use 11/26/2022   Prescreen: >3 drinks/day or >7 drinks/week? No   Prescreen: >3 drinks/day or >7 drinks/week? -       Last PSA: No results found for: PSA    Reviewed orders with patient. Reviewed health maintenance and updated orders accordingly - Yes  Lab work is in process  Labs reviewed in EPIC  BP Readings from Last 3 Encounters:   11/29/22 138/80   12/09/21 122/76   10/27/20 121/79    Wt Readings from Last 3 Encounters:   11/29/22 100.7 kg (222 lb)   12/09/21 100.7 kg (222 lb)   10/27/20 98.4 kg (217 lb)                  Patient Active Problem List   Diagnosis     CARDIOVASCULAR SCREENING; LDL GOAL LESS THAN 160     Moderate recurrent major depression (H)     ADD (attention deficit  disorder)     Family history of diabetes mellitus     Overweight (BMI 25.0-29.9)     Seborrheic dermatitis     Asthma, mild intermittent, well-controlled     Intermittent asthma     Advanced directives, counseling/discussion     Eosinophilic esophagitis     Past Surgical History:   Procedure Laterality Date     ENDOSCOPY  6-11-14     ENT SURGERY      septoplasty     TONSILLECTOMY ADULT  8/7/2012    Procedure: TONSILLECTOMY ADULT;  Tonsillectomy;  Surgeon: Bipin San MD;  Location: [facility] OR     wisdom teeth extraction         Social History     Tobacco Use     Smoking status: Never     Smokeless tobacco: Never   Substance Use Topics     Alcohol use: No     Family History   Problem Relation Age of Onset     Diabetes Mother      Thyroid Disease Mother      Diabetes Father          Current Outpatient Medications   Medication Sig Dispense Refill     budesonide (PULMICORT) 0.5 MG/2ML neb solution Take 2 mLs (0.5 mg) by nebulization daily 90 mL 11     venlafaxine (EFFEXOR XR) 75 MG 24 hr capsule Take 1 capsule (75 mg) by mouth daily 90 capsule 3     No Known Allergies    Reviewed and updated as needed this visit by clinical staff   Tobacco  Allergies  Meds              Reviewed and updated as needed this visit by Provider                     Review of Systems   Constitutional: Negative for chills and fever.   HENT: Negative for congestion, ear pain, hearing loss and sore throat.    Eyes: Negative for pain and visual disturbance.   Respiratory: Negative for cough and shortness of breath.    Cardiovascular: Negative for chest pain, palpitations and peripheral edema.   Gastrointestinal: Negative for abdominal pain, constipation, diarrhea, heartburn, hematochezia and nausea.   Genitourinary: Negative for dysuria, frequency, genital sores, hematuria, impotence, penile discharge and urgency.   Musculoskeletal: Negative for arthralgias, joint swelling and myalgias.   Skin: Negative for rash.   Neurological: Negative  for dizziness, weakness, headaches and paresthesias.   Psychiatric/Behavioral: Negative for mood changes. The patient is not nervous/anxious.      CONSTITUTIONAL: NEGATIVE for fever, chills, change in weight  INTEGUMENTARY/SKIN: NEGATIVE for worrisome rashes, moles or lesions  EYES: NEGATIVE for vision changes or irritation  ENT: NEGATIVE for ear, mouth and throat problems  RESP: NEGATIVE for significant cough or SOB  CV: NEGATIVE for chest pain, palpitations or peripheral edema  GI: NEGATIVE for nausea, abdominal pain, heartburn, or change in bowel habits   male: negative for dysuria, hematuria, decreased urinary stream, erectile dysfunction, urethral discharge  MUSCULOSKELETAL: NEGATIVE for significant arthralgias or myalgia  NEURO: NEGATIVE for weakness, dizziness or paresthesias  PSYCHIATRIC: NEGATIVE for changes in mood or affect    OBJECTIVE:   /80 (BP Location: Right arm, Patient Position: Chair, Cuff Size: Adult Large)   Pulse 80   Temp 97.6  F (36.4  C)   Wt 100.7 kg (222 lb)   SpO2 97%   BMI 28.89 kg/m      Physical Exam  GENERAL: healthy, alert and no distress  EYES: Eyes grossly normal to inspection, PERRL and conjunctivae and sclerae normal  HENT: ear canals and TM's normal, nose and mouth without ulcers or lesions  NECK: no adenopathy, no asymmetry, masses, or scars and thyroid normal to palpation  RESP: lungs clear to auscultation - no rales, rhonchi or wheezes  CV: regular rate and rhythm, normal S1 S2, no S3 or S4, no murmur, click or rub, no peripheral edema and peripheral pulses strong  ABDOMEN: soft, nontender, no hepatosplenomegaly, no masses and bowel sounds normal  MS: no gross musculoskeletal defects noted, no edema  SKIN: no suspicious lesions or rashes  NEURO: Normal strength and tone, mentation intact and speech normal  BACK: no CVA tenderness, no paralumbar tenderness  PSYCH: mentation appears normal, affect normal/bright  LYMPH: no cervical, supraclavicular, axillary, or  inguinal adenopathy    Diagnostic Test Results:  Labs reviewed in Epic  Results for orders placed or performed in visit on 11/29/22   HIV Antigen Antibody Combo     Status: Normal   Result Value Ref Range    HIV Antigen Antibody Combo Nonreactive Nonreactive   Hepatitis C Screen Reflex to HCV RNA Quant and Genotype     Status: Normal   Result Value Ref Range    Hepatitis C Antibody Nonreactive Nonreactive    Narrative    Assay performance characteristics have not been established for newborns, infants, and children.   Lipid panel reflex to direct LDL Fasting     Status: Abnormal   Result Value Ref Range    Cholesterol 187 <200 mg/dL    Triglycerides 126 <150 mg/dL    Direct Measure HDL 43 >=40 mg/dL    LDL Cholesterol Calculated 119 (H) <=100 mg/dL    Non HDL Cholesterol 144 (H) <130 mg/dL    Patient Fasting > 8hrs? Yes     Narrative    Cholesterol  Desirable:  <200 mg/dL    Triglycerides  Normal:  Less than 150 mg/dL  Borderline High:  150-199 mg/dL  High:  200-499 mg/dL  Very High:  Greater than or equal to 500 mg/dL    Direct Measure HDL  Female:  Greater than or equal to 50 mg/dL   Male:  Greater than or equal to 40 mg/dL    LDL Cholesterol  Desirable:  <100mg/dL  Above Desirable:  100-129 mg/dL   Borderline High:  130-159 mg/dL   High:  160-189 mg/dL   Very High:  >= 190 mg/dL    Non HDL Cholesterol  Desirable:  130 mg/dL  Above Desirable:  130-159 mg/dL  Borderline High:  160-189 mg/dL  High:  190-219 mg/dL  Very High:  Greater than or equal to 220 mg/dL   Basic metabolic panel  (Ca, Cl, CO2, Creat, Gluc, K, Na, BUN)     Status: Abnormal   Result Value Ref Range    Sodium 140 133 - 144 mmol/L    Potassium 4.0 3.4 - 5.3 mmol/L    Chloride 108 94 - 109 mmol/L    Carbon Dioxide (CO2) 27 20 - 32 mmol/L    Anion Gap 5 3 - 14 mmol/L    Urea Nitrogen 9 7 - 30 mg/dL    Creatinine 0.89 0.66 - 1.25 mg/dL    Calcium 9.2 8.5 - 10.1 mg/dL    Glucose 104 (H) 70 - 99 mg/dL    GFR Estimate >90 >60 mL/min/1.73m2  "      ASSESSMENT/PLAN:   Alejandro was seen today for physical.    Diagnoses and all orders for this visit:    Hyperlipidemia LDL goal <100  -     Lipid panel reflex to direct LDL Fasting; Future  -     Basic metabolic panel  (Ca, Cl, CO2, Creat, Gluc, K, Na, BUN); Future  -     Lipid panel reflex to direct LDL Fasting  -     Cancel: Lipid panel reflex to direct LDL Fasting  -     Basic metabolic panel  (Ca, Cl, CO2, Creat, Gluc, K, Na, BUN)    Screen for colon cancer    Eosinophilic esophagitis  -     budesonide (PULMICORT) 0.5 MG/2ML neb solution; Take 2 mLs (0.5 mg) by nebulization daily    Moderate recurrent major depression (H)  -     venlafaxine (EFFEXOR XR) 75 MG 24 hr capsule; Take 1 capsule (75 mg) by mouth daily    Screening for HIV (human immunodeficiency virus)  -     HIV Antigen Antibody Combo    Need for hepatitis C screening test  -     Hepatitis C Screen Reflex to HCV RNA Quant and Genotype    Other orders  -     TDAP VACCINE (Adacel, Boostrix)  -     REVIEW OF HEALTH MAINTENANCE PROTOCOL ORDERS              COUNSELING:   Reviewed preventive health counseling, as reflected in patient instructions       Regular exercise       Healthy diet/nutrition       Vision screening       Hearing screening       Colorectal cancer screening      BMI:   Estimated body mass index is 28.89 kg/m  as calculated from the following:    Height as of 12/9/21: 1.867 m (6' 1.5\").    Weight as of this encounter: 100.7 kg (222 lb).   Weight management plan: Discussed healthy diet and exercise guidelines      He reports that he has never smoked. He has never used smokeless tobacco.            Cristian Farrell MD  [facility]  "

## 2023-11-09 ENCOUNTER — OFFICE VISIT (OUTPATIENT)
Dept: FAMILY MEDICINE | Facility: CLINIC | Age: 53
End: 2023-11-09
Payer: COMMERCIAL

## 2023-11-09 VITALS
HEART RATE: 90 BPM | TEMPERATURE: 97.9 F | SYSTOLIC BLOOD PRESSURE: 132 MMHG | WEIGHT: 226 LBS | BODY MASS INDEX: 29 KG/M2 | DIASTOLIC BLOOD PRESSURE: 74 MMHG | RESPIRATION RATE: 18 BRPM | OXYGEN SATURATION: 96 % | HEIGHT: 74 IN

## 2023-11-09 DIAGNOSIS — Z12.11 SCREEN FOR COLON CANCER: Primary | ICD-10-CM

## 2023-11-09 DIAGNOSIS — F33.1 MODERATE RECURRENT MAJOR DEPRESSION (H): ICD-10-CM

## 2023-11-09 DIAGNOSIS — K20.0 EOSINOPHILIC ESOPHAGITIS: ICD-10-CM

## 2023-11-09 PROCEDURE — 90471 IMMUNIZATION ADMIN: CPT | Performed by: INTERNAL MEDICINE

## 2023-11-09 PROCEDURE — 99396 PREV VISIT EST AGE 40-64: CPT | Mod: 25 | Performed by: INTERNAL MEDICINE

## 2023-11-09 PROCEDURE — 90682 RIV4 VACC RECOMBINANT DNA IM: CPT | Performed by: INTERNAL MEDICINE

## 2023-11-09 RX ORDER — VENLAFAXINE HYDROCHLORIDE 75 MG/1
75 CAPSULE, EXTENDED RELEASE ORAL DAILY
Qty: 90 CAPSULE | Refills: 4 | Status: SHIPPED | OUTPATIENT
Start: 2023-11-09 | End: 2024-09-12

## 2023-11-09 RX ORDER — BUDESONIDE 0.5 MG/2ML
0.5 INHALANT ORAL DAILY
Qty: 360 ML | Refills: 4 | Status: SHIPPED | OUTPATIENT
Start: 2023-11-09

## 2023-11-09 ASSESSMENT — ASTHMA QUESTIONNAIRES: ACT_TOTALSCORE: 23

## 2023-11-09 ASSESSMENT — ENCOUNTER SYMPTOMS
HEMATURIA: 0
COUGH: 0
NERVOUS/ANXIOUS: 0
EYE PAIN: 0
FREQUENCY: 0
PALPITATIONS: 0
FEVER: 0
ABDOMINAL PAIN: 0
ARTHRALGIAS: 0
CONSTIPATION: 0
PARESTHESIAS: 0
WEAKNESS: 0
HEMATOCHEZIA: 0
HEARTBURN: 0
DIZZINESS: 0
JOINT SWELLING: 0
DIARRHEA: 0
SHORTNESS OF BREATH: 0
CHILLS: 0
NAUSEA: 0
SORE THROAT: 0
DYSURIA: 0
MYALGIAS: 0
HEADACHES: 0

## 2023-11-09 ASSESSMENT — PATIENT HEALTH QUESTIONNAIRE - PHQ9
SUM OF ALL RESPONSES TO PHQ QUESTIONS 1-9: 2
SUM OF ALL RESPONSES TO PHQ QUESTIONS 1-9: 2
10. IF YOU CHECKED OFF ANY PROBLEMS, HOW DIFFICULT HAVE THESE PROBLEMS MADE IT FOR YOU TO DO YOUR WORK, TAKE CARE OF THINGS AT HOME, OR GET ALONG WITH OTHER PEOPLE: SOMEWHAT DIFFICULT

## 2023-11-09 NOTE — PROGRESS NOTES
"SUBJECTIVE:   CC: Alejandro is an 53 year old who presents for preventative health visit.       11/9/2023     2:45 PM   Additional Questions   Roomed by Lilo ANTHONY    Today's PHQ-9 Score:       11/9/2023    11:21 AM   PHQ-9 SCORE   PHQ-9 Total Score MyChart 2 (Minimal depression)   PHQ-9 Total Score 2       The 10-year ASCVD risk score (Jerry BUSTAMANTE, et al., 2019) is: 5.7%    Values used to calculate the score:      Age: 53 years      Sex: Male      Is Non- : No      Diabetic: No      Tobacco smoker: No      Systolic Blood Pressure: 132 mmHg      Is BP treated: No      HDL Cholesterol: 43 mg/dL      Total Cholesterol: 187 mg/dL      Cologuard  thinking about.    2. Things are getting stuck in the esophagus .    3.  Conisdering egd or colonoscopy                Social History     Tobacco Use    Smoking status: Never     Passive exposure: Never    Smokeless tobacco: Never   Substance Use Topics    Alcohol use: No             11/9/2023    11:25 AM   Alcohol Use   Prescreen: >3 drinks/day or >7 drinks/week? Not Applicable          No data to display                Last PSA: No results found for: \"PSA\"    Reviewed orders with patient. Reviewed health maintenance and updated orders accordingly - Yes  Lab work is in process  Labs reviewed in EPIC  BP Readings from Last 3 Encounters:   11/09/23 132/74   11/29/22 138/80   12/09/21 122/76    Wt Readings from Last 3 Encounters:   11/09/23 226 lb (102.5 kg)   11/29/22 222 lb (100.7 kg)   12/09/21 222 lb (100.7 kg)                  Patient Active Problem List   Diagnosis    CARDIOVASCULAR SCREENING; LDL GOAL LESS THAN 160    Moderate recurrent major depression (H)    ADD (attention deficit disorder)    Family history of diabetes mellitus    Overweight (BMI 25.0-29.9)    Seborrheic dermatitis    Asthma, mild intermittent, well-controlled    Intermittent asthma    Advanced directives, counseling/discussion    Eosinophilic esophagitis     Past Surgical History: " "  Procedure Laterality Date    ENDOSCOPY  6-11-14    ENT SURGERY      septoplasty    TONSILLECTOMY ADULT  8/7/2012    Procedure: TONSILLECTOMY ADULT;  Tonsillectomy;  Surgeon: Bipin San MD;  Location: [facility] OR    wisdom teeth extraction         Social History     Tobacco Use    Smoking status: Never     Passive exposure: Never    Smokeless tobacco: Never   Substance Use Topics    Alcohol use: No     Family History   Problem Relation Age of Onset    Diabetes Mother 72    Thyroid Disease Mother     Diabetes Father          Current Outpatient Medications   Medication Sig Dispense Refill    budesonide (PULMICORT) 0.5 MG/2ML neb solution Take 2 mLs (0.5 mg) by nebulization daily 360 mL 4    venlafaxine (EFFEXOR XR) 75 MG 24 hr capsule Take 1 capsule (75 mg) by mouth daily 90 capsule 4     No Known Allergies    Reviewed and updated as needed this visit by clinical staff   Tobacco  Allergies  Meds              Reviewed and updated as needed this visit by Provider                     Review of Systems  CONSTITUTIONAL: NEGATIVE for fever, chills, change in weight  INTEGUMENTARY/SKIN: NEGATIVE for worrisome rashes, moles or lesions  EYES: NEGATIVE for vision changes or irritation  ENT: NEGATIVE for ear, mouth and throat problems  RESP: NEGATIVE for significant cough or SOB  CV: NEGATIVE for chest pain, palpitations or peripheral edema  GI: NEGATIVE for nausea, abdominal pain, heartburn, or change in bowel habits   male: negative for dysuria, hematuria, decreased urinary stream, erectile dysfunction, urethral discharge  MUSCULOSKELETAL: NEGATIVE for significant arthralgias or myalgia  NEURO: NEGATIVE for weakness, dizziness or paresthesias  PSYCHIATRIC: NEGATIVE for changes in mood or affect    OBJECTIVE:   /74   Pulse 90   Temp 97.9  F (36.6  C)   Resp 18   Ht 6' 1.5\" (1.867 m)   Wt 226 lb (102.5 kg)   SpO2 96%   BMI 29.41 kg/m      Physical Exam  GENERAL: healthy, alert and no distress  EYES: Eyes " "grossly normal to inspection, PERRL and conjunctivae and sclerae normal  HENT: ear canals and TM's normal, nose and mouth without ulcers or lesions  NECK: no adenopathy, no asymmetry, masses, or scars and thyroid normal to palpation  RESP: lungs clear to auscultation - no rales, rhonchi or wheezes  CV: regular rate and rhythm, normal S1 S2, no S3 or S4, no murmur, click or rub, no peripheral edema and peripheral pulses strong  ABDOMEN: soft, nontender, no hepatosplenomegaly, no masses and bowel sounds normal  MS: no gross musculoskeletal defects noted, no edema  SKIN: no suspicious lesions or rashes  NEURO: Normal strength and tone, mentation intact and speech normal  BACK: no CVA tenderness, no paralumbar tenderness  PSYCH: mentation appears normal, affect normal/bright  LYMPH: no cervical, supraclavicular, axillary, or inguinal adenopathy    Diagnostic Test Results:  Labs reviewed in Epic  No results found for any visits on 11/09/23.    ASSESSMENT/PLAN:   Alejandro was seen today for physical.    Diagnoses and all orders for this visit:    Screen for colon cancer    Eosinophilic esophagitis  -     budesonide (PULMICORT) 0.5 MG/2ML neb solution; Take 2 mLs (0.5 mg) by nebulization daily    Moderate recurrent major depression (H)  -     venlafaxine (EFFEXOR XR) 75 MG 24 hr capsule; Take 1 capsule (75 mg) by mouth daily    Other orders  -     REVIEW OF HEALTH MAINTENANCE PROTOCOL ORDERS  -     INFLUENZA VACCINE 18-64Y (FLUBLOK)              COUNSELING:   Reviewed preventive health counseling, as reflected in patient instructions       Regular exercise       Healthy diet/nutrition       Vision screening       Hearing screening       Colorectal cancer screening      BMI:   Estimated body mass index is 29.41 kg/m  as calculated from the following:    Height as of this encounter: 6' 1.5\" (1.867 m).    Weight as of this encounter: 226 lb (102.5 kg).   Weight management plan: Discussed healthy diet and exercise " guidelines      He reports that he has never smoked. He has never been exposed to tobacco smoke. He has never used smokeless tobacco.            Cristian Farrell MD  [facility] [place]  Answers submitted by the patient for this visit:  Patient Health Questionnaire (Submitted on 11/9/2023)  If you checked off any problems, how difficult have these problems made it for you to do your work, take care of things at home, or get along with other people?: Somewhat difficult  PHQ9 TOTAL SCORE: 2

## 2023-11-09 NOTE — PROGRESS NOTES
"SUBJECTIVE:   CC: Alejandro is an 53 year old who presents for preventative health visit.       11/9/2023     2:45 PM   Additional Questions   Roomed by Lilo     Healthy Habits:     Getting at least 3 servings of Calcium per day:  NO    Bi-annual eye exam:  Yes    Dental care twice a year:  Yes    Sleep apnea or symptoms of sleep apnea:  Excessive snoring    Diet:  Regular (no restrictions)    Frequency of exercise:  None    Taking medications regularly:  Yes    Barriers to taking medications:  None    Medication side effects:  None    Additional concerns today:  No      Today's PHQ-9 Score:       11/9/2023    11:21 AM   PHQ-9 SCORE   PHQ-9 Total Score MyChart 2 (Minimal depression)   PHQ-9 Total Score 2     {Outside tests to abstract? (Optional):783699}    {additional problems to add (Optional):611197}      Social History     Tobacco Use    Smoking status: Never     Passive exposure: Never    Smokeless tobacco: Never   Substance Use Topics    Alcohol use: No     {Rooming staff  Click this link to complete the Prescreen if response below is not for today's visit  Alcohol Use Prescreen >3 drinks/day or > 7 drinks/week.  If the prescreen question answer is YES, complete the full AUDIT  :628796}        11/9/2023    11:25 AM   Alcohol Use   Prescreen: >3 drinks/day or >7 drinks/week? Not Applicable       Last PSA: No results found for: \"PSA\"    Reviewed orders with patient. Reviewed health maintenance and updated orders accordingly - { :848113}  {Chronicprobdata (optional):163632}    Reviewed and updated as needed this visit by clinical staff   Tobacco  Allergies  Meds              Reviewed and updated as needed this visit by Provider                 {HISTORY OPTIONS (Optional):886272}    Review of Systems   Constitutional:  Negative for chills and fever.   HENT:  Negative for congestion, ear pain, hearing loss and sore throat.    Eyes:  Negative for pain and visual disturbance.   Respiratory:  Negative for cough and " "shortness of breath.    Cardiovascular:  Negative for chest pain, palpitations and peripheral edema.   Gastrointestinal:  Negative for abdominal pain, constipation, diarrhea, heartburn, hematochezia and nausea.   Genitourinary:  Negative for dysuria, frequency, genital sores, hematuria, impotence, penile discharge and urgency.   Musculoskeletal:  Negative for arthralgias, joint swelling and myalgias.   Skin:  Negative for rash.   Neurological:  Negative for dizziness, weakness, headaches and paresthesias.   Psychiatric/Behavioral:  Negative for mood changes. The patient is not nervous/anxious.      {MALE ROS (Optional):440447}    OBJECTIVE:   BP (!) 140/84 (BP Location: Right arm, Patient Position: Chair, Cuff Size: Adult Regular)   Pulse 90   Temp 97.9  F (36.6  C)   Resp 18   Ht 1.867 m (6' 1.5\")   Wt 102.5 kg (226 lb)   SpO2 96%   BMI 29.41 kg/m      Physical Exam  {Exam Choices (Optional):584245}    {Diagnostic Test Results (Optional):890445}    ASSESSMENT/PLAN:   {Diag Picklist:388751}    {Patient advised of split billing (Optional):165148}      COUNSELING:   {MALE COUNSELING MESSAGES:928200::\"Reviewed preventive health counseling, as reflected in patient instructions\"}      BMI:   Estimated body mass index is 29.41 kg/m  as calculated from the following:    Height as of this encounter: 1.867 m (6' 1.5\").    Weight as of this encounter: 102.5 kg (226 lb).   {Weight Management Plan needed for ACO:113991}      He reports that he has never smoked. He has never been exposed to tobacco smoke. He has never used smokeless tobacco.      {Counseling Resources  [place] Preventive Services Task Force  Cholesterol Screening  Health diet/nutrition  Pooled Cohorts Equation Calculator  USDA's MyPlate  ASA Prophylaxis  Lung CA Screening  Osteoporosis prevention/bone health :370929}  {Prostate Cancer Screening  Consider for men 55-69 per guidance from USPSTF :289185}    Cristian Farrell MD  [facility]  "

## 2023-11-10 ENCOUNTER — PATIENT OUTREACH (OUTPATIENT)
Dept: CARE COORDINATION | Facility: CLINIC | Age: 53
End: 2023-11-10
Payer: COMMERCIAL

## 2023-11-24 ENCOUNTER — PATIENT OUTREACH (OUTPATIENT)
Dept: CARE COORDINATION | Facility: CLINIC | Age: 53
End: 2023-11-24
Payer: COMMERCIAL

## 2024-07-23 ENCOUNTER — MYC MEDICAL ADVICE (OUTPATIENT)
Dept: FAMILY MEDICINE | Facility: CLINIC | Age: 54
End: 2024-07-23
Payer: COMMERCIAL

## 2024-07-23 ASSESSMENT — PATIENT HEALTH QUESTIONNAIRE - PHQ9
SUM OF ALL RESPONSES TO PHQ QUESTIONS 1-9: 6
SUM OF ALL RESPONSES TO PHQ QUESTIONS 1-9: 6
10. IF YOU CHECKED OFF ANY PROBLEMS, HOW DIFFICULT HAVE THESE PROBLEMS MADE IT FOR YOU TO DO YOUR WORK, TAKE CARE OF THINGS AT HOME, OR GET ALONG WITH OTHER PEOPLE: SOMEWHAT DIFFICULT

## 2024-07-23 NOTE — TELEPHONE ENCOUNTER
Patient Quality Outreach    Patient is due for the following:   Asthma  -  ACT needed and Asthma follow-up visit  Colon Cancer Screening  Depression  -  PHQ-9 needed and Depression follow-up visit      Topic Date Due    Hepatitis B Vaccine (1 of 3 - 19+ 3-dose series) Never done    Zoster (Shingles) Vaccine (1 of 2) Never done    Pneumococcal Vaccine (2 of 2 - PCV) 11/15/2020       Next Steps:   Schedule a office visit for Asthma and Depression follow up. Also schedule a colonoscopy     Type of outreach:    Sent [employer] message.      Questions for provider review:    None           Earnestine Jane CMA  Chart routed to Care Team.

## 2024-09-09 ASSESSMENT — ASTHMA QUESTIONNAIRES
QUESTION_1 LAST FOUR WEEKS HOW MUCH OF THE TIME DID YOUR ASTHMA KEEP YOU FROM GETTING AS MUCH DONE AT WORK, SCHOOL OR AT HOME: NONE OF THE TIME
ACT_TOTALSCORE: 25
QUESTION_3 LAST FOUR WEEKS HOW OFTEN DID YOUR ASTHMA SYMPTOMS (WHEEZING, COUGHING, SHORTNESS OF BREATH, CHEST TIGHTNESS OR PAIN) WAKE YOU UP AT NIGHT OR EARLIER THAN USUAL IN THE MORNING: NOT AT ALL
QUESTION_4 LAST FOUR WEEKS HOW OFTEN HAVE YOU USED YOUR RESCUE INHALER OR NEBULIZER MEDICATION (SUCH AS ALBUTEROL): NOT AT ALL
ACT_TOTALSCORE: 25
QUESTION_5 LAST FOUR WEEKS HOW WOULD YOU RATE YOUR ASTHMA CONTROL: COMPLETELY CONTROLLED
QUESTION_2 LAST FOUR WEEKS HOW OFTEN HAVE YOU HAD SHORTNESS OF BREATH: NOT AT ALL

## 2024-09-12 ENCOUNTER — OFFICE VISIT (OUTPATIENT)
Dept: FAMILY MEDICINE | Facility: CLINIC | Age: 54
End: 2024-09-12
Payer: COMMERCIAL

## 2024-09-12 ENCOUNTER — ORDERS ONLY (AUTO-RELEASED) (OUTPATIENT)
Dept: FAMILY MEDICINE | Facility: CLINIC | Age: 54
End: 2024-09-12

## 2024-09-12 VITALS
BODY MASS INDEX: 30.34 KG/M2 | TEMPERATURE: 98.7 F | HEIGHT: 72 IN | DIASTOLIC BLOOD PRESSURE: 83 MMHG | SYSTOLIC BLOOD PRESSURE: 126 MMHG | HEART RATE: 82 BPM | WEIGHT: 224 LBS | OXYGEN SATURATION: 96 % | RESPIRATION RATE: 16 BRPM

## 2024-09-12 DIAGNOSIS — Z12.11 SCREEN FOR COLON CANCER: ICD-10-CM

## 2024-09-12 DIAGNOSIS — F33.1 MODERATE RECURRENT MAJOR DEPRESSION (H): ICD-10-CM

## 2024-09-12 DIAGNOSIS — Z12.11 SCREEN FOR COLON CANCER: Primary | ICD-10-CM

## 2024-09-12 PROCEDURE — 96127 BRIEF EMOTIONAL/BEHAV ASSMT: CPT | Performed by: INTERNAL MEDICINE

## 2024-09-12 PROCEDURE — 99396 PREV VISIT EST AGE 40-64: CPT | Performed by: INTERNAL MEDICINE

## 2024-09-12 RX ORDER — VENLAFAXINE HYDROCHLORIDE 75 MG/1
75 CAPSULE, EXTENDED RELEASE ORAL DAILY
Qty: 90 CAPSULE | Refills: 4 | Status: SHIPPED | OUTPATIENT
Start: 2024-09-12

## 2024-09-12 ASSESSMENT — PATIENT HEALTH QUESTIONNAIRE - PHQ9: SUM OF ALL RESPONSES TO PHQ QUESTIONS 1-9: 9

## 2024-09-12 NOTE — PROGRESS NOTES
Preventive Care Visit  [facility] FLAKITA Farrell MD, Internal Medicine - Pediatrics  Sep 12, 2024      Assessment & Plan   Problem List Items Addressed This Visit    None  Visit Diagnoses       Screen for colon cancer    -  Primary                    BMI  Estimated body mass index is 30.38 kg/m  as calculated from the following:    Height as of this encounter: 1.829 m (6').    Weight as of this encounter: 101.6 kg (224 lb).   Weight management plan: Discussed healthy diet and exercise guidelines    Depression Screening Follow Up        9/12/2024     2:59 PM   PHQ   PHQ-9 Total Score 9   Q9: Thoughts of better off dead/self-harm past 2 weeks Nearly every day         9/12/2024     2:59 PM   Last PHQ-9   1.  Little interest or pleasure in doing things 1   2.  Feeling down, depressed, or hopeless 1   3.  Trouble falling or staying asleep, or sleeping too much 1   4.  Feeling tired or having little energy 1   5.  Poor appetite or overeating 0   6.  Feeling bad about yourself 1   7.  Trouble concentrating 1   8.  Moving slowly or restless 0   Q9: Thoughts of better off dead/self-harm past 2 weeks 3   PHQ-9 Total Score 9   Difficulty at work, home, or with people Not difficult at all           Follow Up Actions Taken  Crisis resource information provided in the After Visit Summary    Discussed the following ways the patient can remain in a safe environment:  be around others  Counseling  Appropriate preventive services were addressed with this patient via screening, questionnaire, or discussion as appropriate for fall prevention, nutrition, physical activity, Tobacco-use cessation, social engagement, weight loss and cognition.  Checklist reviewing preventive services available has been given to the patient.  Reviewed patient's diet, addressing concerns and/or questions.   The patient's PHQ-9 score is consistent with mild depression. He was provided with information regarding depression.     Work on  weight loss  Regular exercise      Subjective   Alejandro is a 54 year old, presenting for the following:  Physical        9/12/2024     2:48 PM   Additional Questions   Roomed by Anne Marie        Health Care Directive  Patient does not have a Health Care Directive or Living Will: Discussed advance care planning with patient; information given to patient to review.    HPI               9/9/2024   General Health   How would you rate your overall physical health? (!) FAIR   Feel stress (tense, anxious, or unable to sleep) To some extent      (!) STRESS CONCERN      9/9/2024   Nutrition   Three or more servings of calcium each day? (!) NO   Diet: Regular (no restrictions)   How many servings of fruit and vegetables per day? (!) 0-1   How many sweetened beverages each day? (!) 2            9/9/2024   Exercise   Days per week of moderate/strenous exercise 0 days   Average minutes spent exercising at this level 0 min      (!) EXERCISE CONCERN      9/9/2024   Social Factors   Worry food won't last until get money to buy more No   Food not last or not have enough money for food? No   Do you have housing? (Housing is defined as stable permanent housing and does not include staying ouside in a car, in a tent, in an abandoned building, in an overnight shelter, or couch-surfing.) Yes   Are you worried about losing your housing? No   Lack of transportation? No   Unable to get utilities (heat,electricity)? No            9/9/2024   Fall Risk   Fallen 2 or more times in the past year? No   Trouble with walking or balance? No             9/9/2024   Dental   Dentist two times every year? Yes            9/9/2024   TB Screening   Were you born outside of the [place]? No          Today's PHQ-9 Score:       9/12/2024     2:59 PM   PHQ-9 SCORE   PHQ-9 Total Score 9         9/9/2024   Substance Use   Alcohol more than 3/day or more than 7/wk Not Applicable   Do you use any other substances recreationally? No        Social History     Tobacco Use     "Smoking status: Never     Passive exposure: Never    Smokeless tobacco: Never   Vaping Use    Vaping status: Never Used   Substance Use Topics    Alcohol use: No    Drug use: No           2024   STI Screening   New sexual partner(s) since last STI/HIV test? No      Last PSA: No results found for: \"PSA\"  ASCVD Risk   The 10-year ASCVD risk score (Jerry BUSTAMANTE, et al., 2019) is: 5.8%    Values used to calculate the score:      Age: 54 years      Sex: Male      Is Non- : No      Diabetic: No      Tobacco smoker: No      Systolic Blood Pressure: 126 mmHg      Is BP treated: No      HDL Cholesterol: 43 mg/dL      Total Cholesterol: 187 mg/dL    Fracture Risk Assessment Tool  Link to Frax Calculator  Use the information below to complete the Frax calculator  : 1970  Sex: male  Weight (kg): 101.6 kg (actual weight)  Height (cm): 182.9 cm  Previous Fragility Fracture:  No  History of parent with fractured hip:  No  Current Smoking:  No  Patient has been on glucocorticoids for more than 3 months (5mg/day or more): No  Rheumatoid Arthritis on Problem List:  No  Secondary Osteoporosis on Problem List:  No  Consumes 3 or more units of alcohol per day: No  Femoral Neck BMD (g/cm2)           Reviewed and updated as needed this visit by Provider                    Lab work is in process  Labs reviewed in EPIC  BP Readings from Last 3 Encounters:   24 126/83   23 132/74   22 138/80    Wt Readings from Last 3 Encounters:   24 101.6 kg (224 lb)   23 102.5 kg (226 lb)   22 100.7 kg (222 lb)                  Patient Active Problem List   Diagnosis    CARDIOVASCULAR SCREENING; LDL GOAL LESS THAN 160    Moderate recurrent major depression (H)    ADD (attention deficit disorder)    Family history of diabetes mellitus    Overweight (BMI 25.0-29.9)    Seborrheic dermatitis    Asthma, mild intermittent, well-controlled    Intermittent asthma    Eosinophilic " esophagitis     Past Surgical History:   Procedure Laterality Date    ENDOSCOPY  6-11-14    ENT SURGERY      septoplasty    TONSILLECTOMY ADULT  8/7/2012    Procedure: TONSILLECTOMY ADULT;  Tonsillectomy;  Surgeon: Bipin San MD;  Location: [facility] OR    wisdom teeth extraction         Social History     Tobacco Use    Smoking status: Never     Passive exposure: Never    Smokeless tobacco: Never   Substance Use Topics    Alcohol use: No     Family History   Problem Relation Age of Onset    Diabetes Mother 72    Thyroid Disease Mother     Diabetes Father          Current Outpatient Medications   Medication Sig Dispense Refill    budesonide (PULMICORT) 0.5 MG/2ML neb solution Take 2 mLs (0.5 mg) by nebulization daily 360 mL 4    venlafaxine (EFFEXOR XR) 75 MG 24 hr capsule Take 1 capsule (75 mg) by mouth daily 90 capsule 4     No Known Allergies      Review of Systems  Constitutional, HEENT, cardiovascular, pulmonary, gi and gu systems are negative, except as otherwise noted.     Objective    Exam  /83 (BP Location: Left arm, Patient Position: Sitting, Cuff Size: Adult Large)   Pulse 82   Temp 98.7  F (37.1  C) (Temporal)   Resp 16   Ht 1.829 m (6')   Wt 101.6 kg (224 lb)   SpO2 96%   BMI 30.38 kg/m     Estimated body mass index is 30.38 kg/m  as calculated from the following:    Height as of this encounter: 1.829 m (6').    Weight as of this encounter: 101.6 kg (224 lb).    Physical Exam  GENERAL: alert and no distress  NECK: no adenopathy, no asymmetry, masses, or scars  RESP: lungs clear to auscultation - no rales, rhonchi or wheezes  CV: regular rate and rhythm, normal S1 S2, no S3 or S4, no murmur, click or rub, no peripheral edema  ABDOMEN: soft, nontender, no hepatosplenomegaly, no masses and bowel sounds normal  MS: no gross musculoskeletal defects noted, no edema        Signed Electronically by: Cristian Farrell MD

## 2024-09-25 ENCOUNTER — MYC MEDICAL ADVICE (OUTPATIENT)
Dept: FAMILY MEDICINE | Facility: CLINIC | Age: 54
End: 2024-09-25
Payer: COMMERCIAL

## 2024-09-26 NOTE — TELEPHONE ENCOUNTER
Routing mychart message to PCP      Can colonoscopy just be done first as screening?      Antonella, RN    Triage Nurse  [facility]

## 2024-10-19 LAB — NONINV COLON CA DNA+OCC BLD SCRN STL QL: NEGATIVE

## 2025-08-13 ENCOUNTER — PATIENT OUTREACH (OUTPATIENT)
Dept: CARE COORDINATION | Facility: CLINIC | Age: 55
End: 2025-08-13
Payer: COMMERCIAL

## 2025-08-23 DIAGNOSIS — F33.1 MODERATE RECURRENT MAJOR DEPRESSION (H): ICD-10-CM

## 2025-08-24 RX ORDER — VENLAFAXINE HYDROCHLORIDE 75 MG/1
75 CAPSULE, EXTENDED RELEASE ORAL DAILY
Qty: 90 CAPSULE | Refills: 0 | Status: SHIPPED | OUTPATIENT
Start: 2025-08-24

## 2025-08-27 ENCOUNTER — PATIENT OUTREACH (OUTPATIENT)
Dept: CARE COORDINATION | Facility: CLINIC | Age: 55
End: 2025-08-27
Payer: COMMERCIAL